# Patient Record
Sex: MALE | Race: WHITE | NOT HISPANIC OR LATINO | Employment: OTHER | URBAN - METROPOLITAN AREA
[De-identification: names, ages, dates, MRNs, and addresses within clinical notes are randomized per-mention and may not be internally consistent; named-entity substitution may affect disease eponyms.]

---

## 2017-03-06 PROBLEM — I25.10 CORONARY ARTERY DISEASE INVOLVING NATIVE CORONARY ARTERY OF NATIVE HEART WITHOUT ANGINA PECTORIS: Status: ACTIVE | Noted: 2017-03-06

## 2019-01-02 ENCOUNTER — HOSPITAL ENCOUNTER (OUTPATIENT)
Dept: RADIOLOGY | Facility: MEDICAL CENTER | Age: 79
End: 2019-01-02

## 2019-02-11 ENCOUNTER — HOSPITAL ENCOUNTER (OUTPATIENT)
Dept: LAB | Facility: MEDICAL CENTER | Age: 79
End: 2019-02-11
Attending: NEUROLOGICAL SURGERY
Payer: MEDICARE

## 2019-02-11 LAB
CFT BLD TEG: 7.8 MIN (ref 5–10)
CLOT ANGLE BLD TEG: 59.5 DEGREES (ref 53–72)
CLOT LYSIS 30M P MA LENFR BLD TEG: 3.3 % (ref 0–8)
CT.EXTRINSIC BLD ROTEM: 2.2 MIN (ref 1–3)
MCF BLD TEG: 62.6 MM (ref 50–70)
MEDICATIONS NOTED 1688: NORMAL
PA AA BLD-ACNC: 12.5 %
PA ADP BLD-ACNC: 1.2 %
PLT FUNCTION COL/EPI  1661: 100 SEC (ref 83–170)
TEG ALGORITHM TGALG: NORMAL

## 2019-02-11 PROCEDURE — 36415 COLL VENOUS BLD VENIPUNCTURE: CPT

## 2019-02-11 PROCEDURE — 85576 BLOOD PLATELET AGGREGATION: CPT | Mod: 91

## 2019-02-11 PROCEDURE — 85347 COAGULATION TIME ACTIVATED: CPT

## 2019-02-11 PROCEDURE — 85384 FIBRINOGEN ACTIVITY: CPT

## 2019-02-11 PROCEDURE — 85576 BLOOD PLATELET AGGREGATION: CPT

## 2022-09-07 ENCOUNTER — OFFICE VISIT (OUTPATIENT)
Dept: CARDIOTHORACIC SURGERY | Facility: MEDICAL CENTER | Age: 82
End: 2022-09-07
Payer: MEDICARE

## 2022-09-07 VITALS
OXYGEN SATURATION: 97 % | TEMPERATURE: 97.5 F | BODY MASS INDEX: 30.51 KG/M2 | SYSTOLIC BLOOD PRESSURE: 132 MMHG | HEIGHT: 63 IN | HEART RATE: 77 BPM | WEIGHT: 172.2 LBS | DIASTOLIC BLOOD PRESSURE: 74 MMHG

## 2022-09-07 DIAGNOSIS — I35.0 SEVERE AORTIC STENOSIS: ICD-10-CM

## 2022-09-07 PROCEDURE — 99205 OFFICE O/P NEW HI 60 MIN: CPT | Performed by: THORACIC SURGERY (CARDIOTHORACIC VASCULAR SURGERY)

## 2022-09-07 ASSESSMENT — ENCOUNTER SYMPTOMS
WHEEZING: 0
DIAPHORESIS: 0
LOSS OF CONSCIOUSNESS: 0
PSYCHIATRIC NEGATIVE: 1
CLAUDICATION: 1
SHORTNESS OF BREATH: 1
DIZZINESS: 1
HEARTBURN: 1
ABDOMINAL PAIN: 0
COUGH: 0
EYES NEGATIVE: 1
WEAKNESS: 0
NECK PAIN: 0
FEVER: 0

## 2022-09-07 ASSESSMENT — FIBROSIS 4 INDEX: FIB4 SCORE: 1.77

## 2022-09-07 NOTE — H&P
REFERRING PHYSICIAN: Dr. Chang MD.     CONSULTING PHYSICIAN: Elaina Abbasi MD     CHIEF COMPLAINT: dyspnea on exertion, chest pressure    HISTORY OF PRESENT ILLNESS: The patient is a 81 y.o. gentleman with known conditions of CAD, HTN, HLD, hypothyroid, DAVID, GERD and previous history of PCI reportedly with 7 stents placed.  At baseline he is very active and independent; he enjoys golf.  He comes in with complaints of dyspnea on exertion, chest pressure, bilateral shoulder and arm discomfort with exertion, some lightheadedness episodes without LOC. He denies chest pain, lower extremity edema, syncope, or PND.      PAST MEDICAL HISTORY:   Active Ambulatory Problems     Diagnosis Date Noted    Reflux esophagitis 06/20/2013    Hypothyroidism 10/06/1997    Essential hypertension, benign 06/20/2013    Hyperlipidemia 01/16/1998    Cardiovascular disease 06/20/2013    Renal insufficiency 02/25/2015    Hyperglycemia 02/25/2015    ASCVD (arteriosclerotic cardiovascular disease) 02/25/2015    Elevated PSA 02/25/2015    Gout 06/10/2015    Tear of medial cartilage or meniscus of knee, current 06/15/2015    Coronary artery disease involving native coronary artery of native heart without angina pectoris 03/06/2017    Acquired hallux rigidus 11/16/2011    GERD (gastroesophageal reflux disease) 05/15/2007    Hx of percutaneous transluminal coronary angioplasty 10/10/2003    Obstructive sleep apnea 10/26/2012    Presence of coronary angioplasty implant and graft 12/13/2012    Swelling of first metatarsophalangeal (MTP) joint 11/16/2011    HTN (hypertension) 05/15/2007    Atherosclerotic heart disease of native coronary artery without angina pectoris 05/30/2012     Resolved Ambulatory Problems     Diagnosis Date Noted    No Resolved Ambulatory Problems     Past Medical History:   Diagnosis Date    Anticoagulant long-term use     BPH (benign prostatic hyperplasia)     BPH (benign prostatic hyperplasia)     Esophageal reflux      Gout flare     Hypertension     Sleep apnea     Thyroid disease        PAST SURGICAL HISTORY:   Past Surgical History:   Procedure Laterality Date    KNEE ARTHROSCOPY  6/15/2015    Procedure: KNEE ARTHROSCOPIC Meniscal Excision, Abrasion Chondroplasty, Synovectomy;  Surgeon: Yasmani Silverio III, M.D.;  Location: SURGERY [facility];  Service:     APPENDECTOMY  2006    ruptured while in [place]    ARTHROPLASTY Right     BUNIONECTOMY Bilateral     FUSION, SPINE, LUMBAR, PLIF      INGUINAL HERNIA REPAIR Left     KNEE ARTHROTOMY Right     36 yrs ago    [facility] CARDIAC CATH  2003 to 2013    x7 with placement 7 stents  (3 were angioplasty)        ALLERGIES: No Known Allergies     CURRENT MEDICATIONS:   Current Outpatient Medications:     atorvastatin (LIPITOR) 80 MG tablet, Take 1 Tablet by mouth every day., Disp: 90 Tablet, Rfl: 1    levothyroxine (SYNTHROID) 100 MCG Tab, Take 1 Tablet by mouth every day., Disp: 90 Tablet, Rfl: 2    amlodipine-benazepril (LOTREL) 5-20 MG per capsule, Take 1 Capsule by mouth every day., Disp: 90 Capsule, Rfl: 3    omeprazole (PRILOSEC) 40 MG delayed-release capsule, Take 1 Capsule by mouth every day., Disp: 90 Capsule, Rfl: 2    Omega-3 Fatty Acids (FISH OIL) 1000 MG Cap capsule, Take 1,000 mg by mouth 3 times a day, with meals., Disp: , Rfl:     multivitamin (THERAGRAN) Tab, Take 1 Tab by mouth every day., Disp: , Rfl:     clopidogrel (PLAVIX) 75 MG Tab, Take 1 Tab by mouth every day., Disp: 90 Tab, Rfl: 3    aspirin (ASA) 325 MG Tab, Take 0.5 Tablets by mouth. 81 mg, Disp: , Rfl:     nitroGLYCERIN (NITROSTAT) 0.3 MG SL tablet, Place 1 Tab under tongue One-time as needed for Chest Pain for up to 1 dose., Disp: 10 Tab, Rfl: 0    atenolol (TENORMIN) 50 MG Tab, Take 0.5 Tablets by mouth 2 times a day., Disp: 180 Tablet, Rfl: 1    FAMILY HISTORY: Negative for early CAD    SOCIAL HISTORY:   Social History     Socioeconomic History    Marital status:      Spouse name: Christine    Number of  children: 5    Years of education: Not on file    Highest education level: Not on file   Occupational History    Not on file   Tobacco Use    Smoking status: Former     Packs/day: 0.50     Years: 50.00     Pack years: 25.00     Types: Cigarettes     Quit date: 2003     Years since quittin.0    Smokeless tobacco: Never   Vaping Use    Vaping Use: Never used   Substance and Sexual Activity    Alcohol use: Not Currently     Alcohol/week: 0.0 oz    Drug use: No    Sexual activity: Not on file   Other Topics Concern    Not on file   Social History Narrative    Not on file     Social Determinants of Health     Financial Resource Strain: Not on file   Food Insecurity: Not on file   Transportation Needs: Not on file   Physical Activity: Inactive    Days of Exercise per Week: 0 days    Minutes of Exercise per Session: 0 min   Stress: Not on file   Social Connections: Not on file   Intimate Partner Violence: Not on file   Housing Stability: Not on file       REVIEW OF SYSTEMS:  Review of Systems   Constitutional:  Negative for diaphoresis, fever and malaise/fatigue.   HENT: Negative.     Eyes: Negative.    Respiratory:  Positive for shortness of breath. Negative for cough and wheezing.    Cardiovascular:  Positive for claudication. Negative for chest pain and leg swelling.        Chest pressure without pain on exertion     Gastrointestinal:  Positive for heartburn. Negative for abdominal pain.   Genitourinary: Negative.    Musculoskeletal:  Positive for joint pain. Negative for neck pain.   Skin: Negative.    Neurological:  Positive for dizziness. Negative for loss of consciousness and weakness.   Endo/Heme/Allergies: Negative.    Psychiatric/Behavioral: Negative.         PHYSICAL EXAMINATION:    Physical Exam  Vitals reviewed.   Constitutional:       General: He is not in acute distress.     Appearance: Normal appearance.   HENT:      Head: Normocephalic and atraumatic.      Right Ear: External ear normal.       "Left Ear: External ear normal.      Nose: Nose normal. No congestion.   Eyes:      General: No scleral icterus.     Extraocular Movements: Extraocular movements intact.      Conjunctiva/sclera: Conjunctivae normal.   Cardiovascular:      Rate and Rhythm: Normal rate and regular rhythm.   Pulmonary:      Effort: Pulmonary effort is normal. No respiratory distress.   Abdominal:      General: There is no distension.      Palpations: Abdomen is soft.   Musculoskeletal:         General: Normal range of motion.      Cervical back: Normal range of motion.   Skin:     General: Skin is warm and dry.      Coloration: Skin is not jaundiced.      Findings: No rash.   Neurological:      Mental Status: He is alert and oriented to person, place, and time.      Cranial Nerves: No cranial nerve deficit.   Psychiatric:         Mood and Affect: Mood normal.         Behavior: Behavior normal.     /74 (BP Location: Left arm, Patient Position: Sitting, BP Cuff Size: Adult)   Pulse 77   Temp 36.4 °C (97.5 °F) (Temporal)   Ht 1.6 m (5' 3\")   Wt 78.1 kg (172 lb 3.2 oz)   SpO2 97%   BMI 30.50 kg/m²      LABS REVIEWED:  Lab Results   Component Value Date/Time    SODIUM 140 08/22/2022 08:31 AM    SODIUM 140 10/19/2021 07:35 AM    POTASSIUM 4.8 08/22/2022 08:31 AM    POTASSIUM 4.4 10/19/2021 07:35 AM    CHLORIDE 108 08/22/2022 08:31 AM    CHLORIDE 107 10/19/2021 07:35 AM    CO2 25 08/22/2022 08:31 AM    CO2 23 10/19/2021 07:35 AM    GLUCOSE 106 (H) 08/22/2022 08:31 AM    GLUCOSE 131 (H) 10/19/2021 07:35 AM    BUN 20 08/22/2022 08:31 AM    BUN 23 (H) 10/19/2021 07:35 AM    CREATININE 1.16 08/22/2022 08:31 AM    CREATININE 1.3 10/19/2021 07:35 AM    GLOMRATE 60 (L) 08/22/2022 08:31 AM      Lab Results   Component Value Date/Time    PROTHROMBTM 9.6 01/27/2016 02:45 PM    INR 0.93 01/27/2016 02:45 PM      Lab Results   Component Value Date/Time    WBC 7.7 10/19/2021 07:35 AM    RBC 5.25 10/19/2021 07:35 AM    HEMOGLOBIN 15.3 10/19/2021 " 07:35 AM    HEMATOCRIT 46.1 10/19/2021 07:35 AM    MCV 87.8 10/19/2021 07:35 AM    MCH 29.1 10/19/2021 07:35 AM    MCHC 33.2 10/19/2021 07:35 AM    MPV 12.0 (H) 10/19/2021 07:35 AM        IMAGING NOT AVAILABLE REPORTS and DOCUMENTATION FROM REFERRING DRS REVIEWED AND INTERPRETED:    ECHOCARDIOGRAM: The patient had   occasional PACs during the exam. The patient had frequent PVCs during the exam.   Left Ventricle:   There is mild concentric left ventricular hypertrophy. The left ventricle is   normal in size. The Ejection Fraction estimate is 50-55%. There is basal inferior hypokinesis. A   variety of Doppler measurements indicate grade II diastolic dysfunction associated with increased   left atrial pressures. The E/E' ratio between the mitral E wave and the mitral annulus E' wave is   19.9.   Right Ventricle:   The right ventricle is grossly normal size. The right ventricular systolic   function is normal.   Atria:   The left atrium is mildly dilated. The left atrial volume index is estimated to be 34-41   ml/m2. The right atrium is normal in size. There is no Doppler evidence for an interatrial shunt.   Mitral Valve:   There is mild mitral annular calcification. The Mitral valve area is calculated at   2.2 cm^2 by the VTI. The mean pressure gradient is 1.5 mmHg. There is mild mitral regurgitation.   Aortic Valve:   The aortic valve is calcified. There is moderate to severe aortic stenosis. The   aortic valve area is calculated at 0.76 cm^2 by the continuity equation. The peak pressure gradient   is 56.8 mmHg. The mean pressure gradient is 33.5 mmHg. There is trace aortic regurgitation.   Tricuspid Valve:   The tricuspid is normal in structure and function. There is trace tricuspid   regurgitation. Right ventricular systolic pressure is elevated at 25-30 mmHg.   Pulmonic Valve:   The pulmonic valve is not well seen, but is grossly normal. There is no pulmonic   valvular regurgitation.   Great Vessels:   The aortic  root is normal size. The ascending aorta measures 36 mm in size. The   abdominal aorta measures 22 mm in size. The IVC was normal in size at < 23 mm and collapsed >50%   with respiration (RAP 3 mmHg).   Pericardium/Pleural:   There is no pericardial effusion.     ANGIOGRAM:     s/p [facility] coronary artery angiography via right radial arterial approach.   EF ~65% NWMA.  Normal LVEDP.     Moderate aortic stenosis.  Mean AV gradient 27.6mmHg.   Right dominant coronary anatomy.   Severe ISR of the mid RCA stents.  s/p laser atherectomy of the RCA.  s/p   JINA PCI of the mid RCA.   PTCA of the PLB for ISR without complications.   Mild proximal circumflex disease.  Moderate to severe obtuse marginal   disease.   Patent proximal and mid LAD stents with moderate LAD ISR.   Occlusion of first diagonal stent.       IMPRESSION:  82 yo gentleman with known medical conditions of CAD, HTN, HLD, peripheral arterial disease, hypothyroid, DAVID, GERD and previous history of PCI and 25 pack year smoking history now in remission now with severe symptomatic aortic stenosis.      PLAN:  I recommend the patient is a reasonable candidate for TAVR given age and preference. Agree with repeating TTE. CT scan scheduled soon to evaluate for access given known PAD.      The STS mortality risk score is 2.5% and the morbidity and mortality risk score is 12%. The scores were discussed with patient.     Thank you for this very challenging consultation and participation in the patient’s care.  I will keep you apprised of all future developments.            Sincerely,     Elaina Abbasi MD

## 2022-09-09 PROBLEM — I35.0 NONRHEUMATIC AORTIC VALVE STENOSIS: Status: ACTIVE | Noted: 2022-02-23

## 2022-09-09 PROBLEM — E78.5 DYSLIPIDEMIA: Status: ACTIVE | Noted: 2022-02-23

## 2022-09-09 PROBLEM — I34.0 NONRHEUMATIC MITRAL VALVE REGURGITATION: Status: ACTIVE | Noted: 2022-02-23

## 2023-09-22 PROBLEM — N18.31 STAGE 3A CHRONIC KIDNEY DISEASE (CKD): Status: ACTIVE | Noted: 2023-08-09

## 2023-09-22 PROBLEM — N40.0 BENIGN PROSTATIC HYPERPLASIA: Status: ACTIVE | Noted: 2023-09-22

## 2024-09-09 PROBLEM — M47.817 SPONDYLOSIS OF LUMBOSACRAL SPINE WITHOUT MYELOPATHY: Status: ACTIVE | Noted: 2024-09-09

## 2024-09-09 PROBLEM — I70.212 ATHEROSCLEROSIS OF NATIVE ARTERY OF LEFT LOWER EXTREMITY WITH INTERMITTENT CLAUDICATION (HCC): Status: ACTIVE | Noted: 2024-08-06

## 2025-02-27 ENCOUNTER — APPOINTMENT (OUTPATIENT)
Dept: RADIOLOGY | Facility: MEDICAL CENTER | Age: 85
End: 2025-02-27
Payer: COMMERCIAL

## 2025-02-27 ENCOUNTER — HOSPITAL ENCOUNTER (INPATIENT)
Facility: MEDICAL CENTER | Age: 85
End: 2025-02-27
Attending: INTERNAL MEDICINE | Admitting: INTERNAL MEDICINE
Payer: COMMERCIAL

## 2025-02-27 DIAGNOSIS — Z91.89 AT RISK FOR MALNUTRITION: ICD-10-CM

## 2025-02-27 DIAGNOSIS — K81.9 CHOLECYSTITIS: ICD-10-CM

## 2025-02-27 DIAGNOSIS — K85.10 GALLSTONE PANCREATITIS: ICD-10-CM

## 2025-02-27 DIAGNOSIS — I25.10 CARDIOVASCULAR DISEASE: ICD-10-CM

## 2025-02-27 DIAGNOSIS — E03.9 HYPOTHYROIDISM, UNSPECIFIED TYPE: ICD-10-CM

## 2025-02-27 DIAGNOSIS — Z91.89 AT RISK FOR CONSTIPATION: ICD-10-CM

## 2025-02-27 PROBLEM — I48.0 PAROXYSMAL ATRIAL FIBRILLATION (HCC): Status: ACTIVE | Noted: 2025-02-27

## 2025-02-27 LAB
ALBUMIN SERPL BCP-MCNC: 4.1 G/DL (ref 3.2–4.9)
ALBUMIN/GLOB SERPL: 1.4 G/DL
ALP SERPL-CCNC: 115 U/L (ref 30–99)
ALT SERPL-CCNC: 242 U/L (ref 2–50)
ANION GAP SERPL CALC-SCNC: 13 MMOL/L (ref 7–16)
AST SERPL-CCNC: 384 U/L (ref 12–45)
BILIRUB SERPL-MCNC: 2.3 MG/DL (ref 0.1–1.5)
BUN SERPL-MCNC: 19 MG/DL (ref 8–22)
CALCIUM ALBUM COR SERPL-MCNC: 9.1 MG/DL (ref 8.5–10.5)
CALCIUM SERPL-MCNC: 9.2 MG/DL (ref 8.5–10.5)
CHLORIDE SERPL-SCNC: 104 MMOL/L (ref 96–112)
CHOLEST SERPL-MCNC: 258 MG/DL (ref 100–199)
CO2 SERPL-SCNC: 21 MMOL/L (ref 20–33)
CREAT SERPL-MCNC: 1.35 MG/DL (ref 0.5–1.4)
ERYTHROCYTE [DISTWIDTH] IN BLOOD BY AUTOMATED COUNT: 42.6 FL (ref 35.9–50)
GFR SERPLBLD CREATININE-BSD FMLA CKD-EPI: 51 ML/MIN/1.73 M 2
GLOBULIN SER CALC-MCNC: 3 G/DL (ref 1.9–3.5)
GLUCOSE SERPL-MCNC: 139 MG/DL (ref 65–99)
HAV IGM SERPL QL IA: NORMAL
HBV CORE IGM SER QL: NORMAL
HBV SURFACE AG SER QL: NORMAL
HCT VFR BLD AUTO: 43 % (ref 42–52)
HCV AB SER QL: NORMAL
HDLC SERPL-MCNC: 57 MG/DL
HGB BLD-MCNC: 14.3 G/DL (ref 14–18)
HOLDING TUBE BB 8507: NORMAL
INR PPP: 1.06 (ref 0.87–1.13)
LACTATE SERPL-SCNC: 2.4 MMOL/L (ref 0.5–2)
LDLC SERPL CALC-MCNC: 187 MG/DL
LIPASE SERPL-CCNC: >3000 U/L (ref 11–82)
MAGNESIUM SERPL-MCNC: 2.3 MG/DL (ref 1.5–2.5)
MCH RBC QN AUTO: 28.8 PG (ref 27–33)
MCHC RBC AUTO-ENTMCNC: 33.3 G/DL (ref 32.3–36.5)
MCV RBC AUTO: 86.7 FL (ref 81.4–97.8)
NT-PROBNP SERPL IA-MCNC: 1308 PG/ML (ref 0–125)
PHOSPHATE SERPL-MCNC: 3.9 MG/DL (ref 2.5–4.5)
PLATELET # BLD AUTO: 251 K/UL (ref 164–446)
PMV BLD AUTO: 11.2 FL (ref 9–12.9)
POTASSIUM SERPL-SCNC: 4.6 MMOL/L (ref 3.6–5.5)
PROT SERPL-MCNC: 7.1 G/DL (ref 6–8.2)
PROTHROMBIN TIME: 13.8 SEC (ref 12–14.6)
RBC # BLD AUTO: 4.96 M/UL (ref 4.7–6.1)
SODIUM SERPL-SCNC: 138 MMOL/L (ref 135–145)
TRIGL SERPL-MCNC: 70 MG/DL (ref 0–149)
TROPONIN T SERPL-MCNC: 24 NG/L (ref 6–19)
TROPONIN T SERPL-MCNC: 26 NG/L (ref 6–19)
WBC # BLD AUTO: 20.1 K/UL (ref 4.8–10.8)

## 2025-02-27 PROCEDURE — 36415 COLL VENOUS BLD VENIPUNCTURE: CPT

## 2025-02-27 PROCEDURE — 83690 ASSAY OF LIPASE: CPT

## 2025-02-27 PROCEDURE — 80061 LIPID PANEL: CPT

## 2025-02-27 PROCEDURE — 84100 ASSAY OF PHOSPHORUS: CPT

## 2025-02-27 PROCEDURE — 83735 ASSAY OF MAGNESIUM: CPT

## 2025-02-27 PROCEDURE — 700111 HCHG RX REV CODE 636 W/ 250 OVERRIDE (IP): Performed by: STUDENT IN AN ORGANIZED HEALTH CARE EDUCATION/TRAINING PROGRAM

## 2025-02-27 PROCEDURE — 87522 HEPATITIS C REVRS TRNSCRPJ: CPT

## 2025-02-27 PROCEDURE — 85610 PROTHROMBIN TIME: CPT

## 2025-02-27 PROCEDURE — 85027 COMPLETE CBC AUTOMATED: CPT

## 2025-02-27 PROCEDURE — 84484 ASSAY OF TROPONIN QUANT: CPT

## 2025-02-27 PROCEDURE — 83605 ASSAY OF LACTIC ACID: CPT

## 2025-02-27 PROCEDURE — 99223 1ST HOSP IP/OBS HIGH 75: CPT | Mod: AI | Performed by: STUDENT IN AN ORGANIZED HEALTH CARE EDUCATION/TRAINING PROGRAM

## 2025-02-27 PROCEDURE — A9270 NON-COVERED ITEM OR SERVICE: HCPCS | Performed by: STUDENT IN AN ORGANIZED HEALTH CARE EDUCATION/TRAINING PROGRAM

## 2025-02-27 PROCEDURE — 80053 COMPREHEN METABOLIC PANEL: CPT

## 2025-02-27 PROCEDURE — 700102 HCHG RX REV CODE 250 W/ 637 OVERRIDE(OP): Performed by: STUDENT IN AN ORGANIZED HEALTH CARE EDUCATION/TRAINING PROGRAM

## 2025-02-27 PROCEDURE — 770020 HCHG ROOM/CARE - TELE (206)

## 2025-02-27 PROCEDURE — 83880 ASSAY OF NATRIURETIC PEPTIDE: CPT

## 2025-02-27 PROCEDURE — 700105 HCHG RX REV CODE 258: Performed by: STUDENT IN AN ORGANIZED HEALTH CARE EDUCATION/TRAINING PROGRAM

## 2025-02-27 PROCEDURE — 80074 ACUTE HEPATITIS PANEL: CPT

## 2025-02-27 RX ORDER — HEPARIN SODIUM 5000 [USP'U]/ML
5000 INJECTION, SOLUTION INTRAVENOUS; SUBCUTANEOUS EVERY 8 HOURS
Status: DISCONTINUED | OUTPATIENT
Start: 2025-02-27 | End: 2025-03-03

## 2025-02-27 RX ORDER — OMEPRAZOLE 20 MG/1
40 CAPSULE, DELAYED RELEASE ORAL DAILY
Status: DISCONTINUED | OUTPATIENT
Start: 2025-02-28 | End: 2025-03-06 | Stop reason: HOSPADM

## 2025-02-27 RX ORDER — FEBUXOSTAT 40 MG/1
40 TABLET, FILM COATED ORAL DAILY
Status: DISCONTINUED | OUTPATIENT
Start: 2025-02-28 | End: 2025-02-28

## 2025-02-27 RX ORDER — ACETAMINOPHEN 500 MG
1000 TABLET ORAL EVERY 6 HOURS PRN
Status: DISCONTINUED | OUTPATIENT
Start: 2025-03-04 | End: 2025-03-06 | Stop reason: HOSPADM

## 2025-02-27 RX ORDER — TAMSULOSIN HYDROCHLORIDE 0.4 MG/1
0.4 CAPSULE ORAL DAILY
Status: DISCONTINUED | OUTPATIENT
Start: 2025-02-28 | End: 2025-03-03

## 2025-02-27 RX ORDER — ROSUVASTATIN CALCIUM 20 MG/1
40 TABLET, COATED ORAL DAILY
Status: DISCONTINUED | OUTPATIENT
Start: 2025-02-28 | End: 2025-03-06 | Stop reason: HOSPADM

## 2025-02-27 RX ORDER — SODIUM CHLORIDE, SODIUM LACTATE, POTASSIUM CHLORIDE, CALCIUM CHLORIDE 600; 310; 30; 20 MG/100ML; MG/100ML; MG/100ML; MG/100ML
INJECTION, SOLUTION INTRAVENOUS CONTINUOUS
Status: DISCONTINUED | OUTPATIENT
Start: 2025-02-27 | End: 2025-03-02

## 2025-02-27 RX ORDER — AMLODIPINE AND BENAZEPRIL HYDROCHLORIDE 5; 20 MG/1; MG/1
1 CAPSULE ORAL DAILY
Status: DISCONTINUED | OUTPATIENT
Start: 2025-02-28 | End: 2025-02-28

## 2025-02-27 RX ORDER — AMOXICILLIN 250 MG
2 CAPSULE ORAL EVERY EVENING
Status: DISCONTINUED | OUTPATIENT
Start: 2025-02-27 | End: 2025-03-06 | Stop reason: HOSPADM

## 2025-02-27 RX ORDER — LEVOTHYROXINE SODIUM 100 UG/1
100 TABLET ORAL DAILY
Status: DISCONTINUED | OUTPATIENT
Start: 2025-02-28 | End: 2025-03-06 | Stop reason: HOSPADM

## 2025-02-27 RX ORDER — ACETAMINOPHEN 500 MG
1000 TABLET ORAL EVERY 6 HOURS
Status: DISPENSED | OUTPATIENT
Start: 2025-02-27 | End: 2025-03-04

## 2025-02-27 RX ORDER — ONDANSETRON 4 MG/1
4 TABLET, ORALLY DISINTEGRATING ORAL EVERY 4 HOURS PRN
Status: DISCONTINUED | OUTPATIENT
Start: 2025-02-27 | End: 2025-03-06 | Stop reason: HOSPADM

## 2025-02-27 RX ORDER — FINASTERIDE 5 MG/1
5 TABLET, FILM COATED ORAL DAILY
Status: DISCONTINUED | OUTPATIENT
Start: 2025-02-28 | End: 2025-03-06 | Stop reason: HOSPADM

## 2025-02-27 RX ORDER — ONDANSETRON 2 MG/ML
4 INJECTION INTRAMUSCULAR; INTRAVENOUS EVERY 4 HOURS PRN
Status: DISCONTINUED | OUTPATIENT
Start: 2025-02-27 | End: 2025-03-06 | Stop reason: HOSPADM

## 2025-02-27 RX ORDER — NITROGLYCERIN 0.4 MG/1
0.4 TABLET SUBLINGUAL
Status: DISCONTINUED | OUTPATIENT
Start: 2025-02-27 | End: 2025-03-06 | Stop reason: HOSPADM

## 2025-02-27 RX ORDER — EZETIMIBE 10 MG/1
10 TABLET ORAL DAILY
Status: DISCONTINUED | OUTPATIENT
Start: 2025-02-28 | End: 2025-03-06 | Stop reason: HOSPADM

## 2025-02-27 RX ORDER — OXYCODONE HYDROCHLORIDE 5 MG/1
2.5 TABLET ORAL EVERY 4 HOURS PRN
Refills: 0 | Status: DISCONTINUED | OUTPATIENT
Start: 2025-02-27 | End: 2025-03-06 | Stop reason: HOSPADM

## 2025-02-27 RX ORDER — LABETALOL HYDROCHLORIDE 5 MG/ML
10 INJECTION, SOLUTION INTRAVENOUS EVERY 4 HOURS PRN
Status: DISCONTINUED | OUTPATIENT
Start: 2025-02-27 | End: 2025-03-06 | Stop reason: HOSPADM

## 2025-02-27 RX ORDER — HYDROMORPHONE HYDROCHLORIDE 1 MG/ML
0.25 INJECTION, SOLUTION INTRAMUSCULAR; INTRAVENOUS; SUBCUTANEOUS EVERY 4 HOURS PRN
Status: DISCONTINUED | OUTPATIENT
Start: 2025-02-27 | End: 2025-03-06 | Stop reason: HOSPADM

## 2025-02-27 RX ORDER — DIGOXIN 125 MCG
62.5 TABLET ORAL DAILY
Status: DISCONTINUED | OUTPATIENT
Start: 2025-02-28 | End: 2025-03-06 | Stop reason: HOSPADM

## 2025-02-27 RX ORDER — POLYETHYLENE GLYCOL 3350 17 G/17G
1 POWDER, FOR SOLUTION ORAL
Status: DISCONTINUED | OUTPATIENT
Start: 2025-02-27 | End: 2025-03-06 | Stop reason: HOSPADM

## 2025-02-27 RX ORDER — OXYCODONE HYDROCHLORIDE 5 MG/1
5 TABLET ORAL EVERY 4 HOURS PRN
Refills: 0 | Status: DISCONTINUED | OUTPATIENT
Start: 2025-02-27 | End: 2025-03-06 | Stop reason: HOSPADM

## 2025-02-27 RX ORDER — SODIUM CHLORIDE, SODIUM LACTATE, POTASSIUM CHLORIDE, AND CALCIUM CHLORIDE .6; .31; .03; .02 G/100ML; G/100ML; G/100ML; G/100ML
500 INJECTION, SOLUTION INTRAVENOUS ONCE
Status: COMPLETED | OUTPATIENT
Start: 2025-02-27 | End: 2025-02-27

## 2025-02-27 RX ADMIN — HEPARIN SODIUM 5000 UNITS: 5000 INJECTION, SOLUTION INTRAVENOUS; SUBCUTANEOUS at 23:05

## 2025-02-27 RX ADMIN — OXYCODONE HYDROCHLORIDE 5 MG: 5 TABLET ORAL at 20:48

## 2025-02-27 RX ADMIN — ACETAMINOPHEN 1000 MG: 500 TABLET ORAL at 23:05

## 2025-02-27 RX ADMIN — SODIUM CHLORIDE, POTASSIUM CHLORIDE, SODIUM LACTATE AND CALCIUM CHLORIDE 500 ML: 600; 310; 30; 20 INJECTION, SOLUTION INTRAVENOUS at 23:09

## 2025-02-27 RX ADMIN — ACETAMINOPHEN 1000 MG: 500 TABLET ORAL at 20:06

## 2025-02-27 RX ADMIN — SODIUM CHLORIDE, POTASSIUM CHLORIDE, SODIUM LACTATE AND CALCIUM CHLORIDE: 600; 310; 30; 20 INJECTION, SOLUTION INTRAVENOUS at 20:16

## 2025-02-27 RX ADMIN — SENNOSIDES AND DOCUSATE SODIUM 2 TABLET: 50; 8.6 TABLET ORAL at 20:06

## 2025-02-27 SDOH — ECONOMIC STABILITY: TRANSPORTATION INSECURITY
IN THE PAST 12 MONTHS, HAS THE LACK OF TRANSPORTATION KEPT YOU FROM MEDICAL APPOINTMENTS OR FROM GETTING MEDICATIONS?: NO

## 2025-02-27 SDOH — ECONOMIC STABILITY: TRANSPORTATION INSECURITY
IN THE PAST 12 MONTHS, HAS LACK OF RELIABLE TRANSPORTATION KEPT YOU FROM MEDICAL APPOINTMENTS, MEETINGS, WORK OR FROM GETTING THINGS NEEDED FOR DAILY LIVING?: NO

## 2025-02-27 ASSESSMENT — SOCIAL DETERMINANTS OF HEALTH (SDOH)
WITHIN THE LAST YEAR, HAVE YOU BEEN KICKED, HIT, SLAPPED, OR OTHERWISE PHYSICALLY HURT BY YOUR PARTNER OR EX-PARTNER?: NO
WITHIN THE LAST YEAR, HAVE YOU BEEN KICKED, HIT, SLAPPED, OR OTHERWISE PHYSICALLY HURT BY YOUR PARTNER OR EX-PARTNER?: NO
IN THE PAST 12 MONTHS, HAS THE ELECTRIC, GAS, OIL, OR WATER COMPANY THREATENED TO SHUT OFF SERVICE IN YOUR HOME?: NO
WITHIN THE LAST YEAR, HAVE YOU BEEN AFRAID OF YOUR PARTNER OR EX-PARTNER?: NO
WITHIN THE PAST 12 MONTHS, THE FOOD YOU BOUGHT JUST DIDN'T LAST AND YOU DIDN'T HAVE MONEY TO GET MORE: NEVER TRUE
WITHIN THE LAST YEAR, HAVE YOU BEEN AFRAID OF YOUR PARTNER OR EX-PARTNER?: NO
WITHIN THE LAST YEAR, HAVE YOU BEEN HUMILIATED OR EMOTIONALLY ABUSED IN OTHER WAYS BY YOUR PARTNER OR EX-PARTNER?: NO
WITHIN THE LAST YEAR, HAVE YOU BEEN HUMILIATED OR EMOTIONALLY ABUSED IN OTHER WAYS BY YOUR PARTNER OR EX-PARTNER?: NO
WITHIN THE LAST YEAR, HAVE TO BEEN RAPED OR FORCED TO HAVE ANY KIND OF SEXUAL ACTIVITY BY YOUR PARTNER OR EX-PARTNER?: NO
WITHIN THE PAST 12 MONTHS, YOU WORRIED THAT YOUR FOOD WOULD RUN OUT BEFORE YOU GOT THE MONEY TO BUY MORE: NEVER TRUE
WITHIN THE LAST YEAR, HAVE TO BEEN RAPED OR FORCED TO HAVE ANY KIND OF SEXUAL ACTIVITY BY YOUR PARTNER OR EX-PARTNER?: NO

## 2025-02-27 ASSESSMENT — LIFESTYLE VARIABLES
ON A TYPICAL DAY WHEN YOU DRINK ALCOHOL HOW MANY DRINKS DO YOU HAVE: 0
AVERAGE NUMBER OF DAYS PER WEEK YOU HAVE A DRINK CONTAINING ALCOHOL: 0
EVER FELT BAD OR GUILTY ABOUT YOUR DRINKING: NO
HAVE YOU EVER FELT YOU SHOULD CUT DOWN ON YOUR DRINKING: NO
HOW MANY TIMES IN THE PAST YEAR HAVE YOU HAD 5 OR MORE DRINKS IN A DAY: 0
ALCOHOL_USE: NO
HAVE PEOPLE ANNOYED YOU BY CRITICIZING YOUR DRINKING: NO
EVER HAD A DRINK FIRST THING IN THE MORNING TO STEADY YOUR NERVES TO GET RID OF A HANGOVER: NO
TOTAL SCORE: 0
CONSUMPTION TOTAL: NEGATIVE

## 2025-02-27 ASSESSMENT — COGNITIVE AND FUNCTIONAL STATUS - GENERAL
SUGGESTED CMS G CODE MODIFIER DAILY ACTIVITY: CH
DAILY ACTIVITIY SCORE: 24
CLIMB 3 TO 5 STEPS WITH RAILING: A LITTLE
MOBILITY SCORE: 23
SUGGESTED CMS G CODE MODIFIER MOBILITY: CI

## 2025-02-27 ASSESSMENT — ENCOUNTER SYMPTOMS
BACK PAIN: 0
DOUBLE VISION: 0
COUGH: 0
NAUSEA: 1
ABDOMINAL PAIN: 1
BLURRED VISION: 0
FEVER: 0
HEADACHES: 0
SHORTNESS OF BREATH: 0
PALPITATIONS: 0
EYE PAIN: 0
NECK PAIN: 0
DIZZINESS: 0
DIARRHEA: 0
BLOOD IN STOOL: 0
WHEEZING: 0
VOMITING: 1
CHILLS: 0
CONSTIPATION: 0

## 2025-02-27 ASSESSMENT — PAIN DESCRIPTION - PAIN TYPE: TYPE: ACUTE PAIN

## 2025-02-27 ASSESSMENT — PATIENT HEALTH QUESTIONNAIRE - PHQ9
SUM OF ALL RESPONSES TO PHQ9 QUESTIONS 1 AND 2: 0
2. FEELING DOWN, DEPRESSED, IRRITABLE, OR HOPELESS: NOT AT ALL
1. LITTLE INTEREST OR PLEASURE IN DOING THINGS: NOT AT ALL

## 2025-02-27 ASSESSMENT — FIBROSIS 4 INDEX
FIB4 SCORE: 4.81
FIB4 SCORE: 5.44

## 2025-02-28 ENCOUNTER — APPOINTMENT (OUTPATIENT)
Dept: RADIOLOGY | Facility: MEDICAL CENTER | Age: 85
DRG: 417 | End: 2025-02-28
Attending: STUDENT IN AN ORGANIZED HEALTH CARE EDUCATION/TRAINING PROGRAM
Payer: COMMERCIAL

## 2025-02-28 VITALS
TEMPERATURE: 97.9 F | HEART RATE: 74 BPM | WEIGHT: 172.84 LBS | DIASTOLIC BLOOD PRESSURE: 72 MMHG | SYSTOLIC BLOOD PRESSURE: 136 MMHG | RESPIRATION RATE: 18 BRPM | OXYGEN SATURATION: 89 % | BODY MASS INDEX: 30.62 KG/M2

## 2025-02-28 LAB
ALBUMIN SERPL BCP-MCNC: 3.6 G/DL (ref 3.2–4.9)
ALBUMIN/GLOB SERPL: 1.4 G/DL
ALP SERPL-CCNC: 95 U/L (ref 30–99)
ALT SERPL-CCNC: 216 U/L (ref 2–50)
ANION GAP SERPL CALC-SCNC: 9 MMOL/L (ref 7–16)
AST SERPL-CCNC: 257 U/L (ref 12–45)
BASOPHILS # BLD AUTO: 0.4 % (ref 0–1.8)
BASOPHILS # BLD: 0.06 K/UL (ref 0–0.12)
BILIRUB SERPL-MCNC: 1.8 MG/DL (ref 0.1–1.5)
BUN SERPL-MCNC: 18 MG/DL (ref 8–22)
CALCIUM ALBUM COR SERPL-MCNC: 8.9 MG/DL (ref 8.5–10.5)
CALCIUM SERPL-MCNC: 8.6 MG/DL (ref 8.5–10.5)
CHLORIDE SERPL-SCNC: 106 MMOL/L (ref 96–112)
CO2 SERPL-SCNC: 24 MMOL/L (ref 20–33)
CREAT SERPL-MCNC: 1.4 MG/DL (ref 0.5–1.4)
EOSINOPHIL # BLD AUTO: 0.04 K/UL (ref 0–0.51)
EOSINOPHIL NFR BLD: 0.3 % (ref 0–6.9)
ERYTHROCYTE [DISTWIDTH] IN BLOOD BY AUTOMATED COUNT: 43 FL (ref 35.9–50)
GFR SERPLBLD CREATININE-BSD FMLA CKD-EPI: 49 ML/MIN/1.73 M 2
GLOBULIN SER CALC-MCNC: 2.5 G/DL (ref 1.9–3.5)
GLUCOSE SERPL-MCNC: 126 MG/DL (ref 65–99)
HCT VFR BLD AUTO: 39.1 % (ref 42–52)
HGB BLD-MCNC: 12.7 G/DL (ref 14–18)
IMM GRANULOCYTES # BLD AUTO: 0.08 K/UL (ref 0–0.11)
IMM GRANULOCYTES NFR BLD AUTO: 0.6 % (ref 0–0.9)
LACTATE SERPL-SCNC: 1.3 MMOL/L (ref 0.5–2)
LYMPHOCYTES # BLD AUTO: 1.44 K/UL (ref 1–4.8)
LYMPHOCYTES NFR BLD: 10 % (ref 22–41)
MCH RBC QN AUTO: 28.5 PG (ref 27–33)
MCHC RBC AUTO-ENTMCNC: 32.5 G/DL (ref 32.3–36.5)
MCV RBC AUTO: 87.7 FL (ref 81.4–97.8)
MONOCYTES # BLD AUTO: 0.9 K/UL (ref 0–0.85)
MONOCYTES NFR BLD AUTO: 6.2 % (ref 0–13.4)
NEUTROPHILS # BLD AUTO: 11.95 K/UL (ref 1.82–7.42)
NEUTROPHILS NFR BLD: 82.5 % (ref 44–72)
NRBC # BLD AUTO: 0 K/UL
NRBC BLD-RTO: 0 /100 WBC (ref 0–0.2)
PLATELET # BLD AUTO: 229 K/UL (ref 164–446)
PMV BLD AUTO: 11.2 FL (ref 9–12.9)
POTASSIUM SERPL-SCNC: 5.4 MMOL/L (ref 3.6–5.5)
PROT SERPL-MCNC: 6.1 G/DL (ref 6–8.2)
RBC # BLD AUTO: 4.46 M/UL (ref 4.7–6.1)
SODIUM SERPL-SCNC: 139 MMOL/L (ref 135–145)
WBC # BLD AUTO: 14.5 K/UL (ref 4.8–10.8)

## 2025-02-28 PROCEDURE — 700111 HCHG RX REV CODE 636 W/ 250 OVERRIDE (IP): Performed by: STUDENT IN AN ORGANIZED HEALTH CARE EDUCATION/TRAINING PROGRAM

## 2025-02-28 PROCEDURE — 99232 SBSQ HOSP IP/OBS MODERATE 35: CPT | Mod: GC | Performed by: INTERNAL MEDICINE

## 2025-02-28 PROCEDURE — 700105 HCHG RX REV CODE 258: Performed by: STUDENT IN AN ORGANIZED HEALTH CARE EDUCATION/TRAINING PROGRAM

## 2025-02-28 PROCEDURE — 83605 ASSAY OF LACTIC ACID: CPT

## 2025-02-28 PROCEDURE — 99222 1ST HOSP IP/OBS MODERATE 55: CPT | Performed by: STUDENT IN AN ORGANIZED HEALTH CARE EDUCATION/TRAINING PROGRAM

## 2025-02-28 PROCEDURE — 85025 COMPLETE CBC W/AUTO DIFF WBC: CPT

## 2025-02-28 PROCEDURE — A9270 NON-COVERED ITEM OR SERVICE: HCPCS | Performed by: STUDENT IN AN ORGANIZED HEALTH CARE EDUCATION/TRAINING PROGRAM

## 2025-02-28 PROCEDURE — 36415 COLL VENOUS BLD VENIPUNCTURE: CPT

## 2025-02-28 PROCEDURE — 770020 HCHG ROOM/CARE - TELE (206)

## 2025-02-28 PROCEDURE — 700102 HCHG RX REV CODE 250 W/ 637 OVERRIDE(OP): Performed by: STUDENT IN AN ORGANIZED HEALTH CARE EDUCATION/TRAINING PROGRAM

## 2025-02-28 PROCEDURE — 80053 COMPREHEN METABOLIC PANEL: CPT

## 2025-02-28 RX ORDER — AMLODIPINE BESYLATE 5 MG/1
5 TABLET ORAL
Status: DISCONTINUED | OUTPATIENT
Start: 2025-02-28 | End: 2025-03-06 | Stop reason: HOSPADM

## 2025-02-28 RX ORDER — METOPROLOL SUCCINATE 50 MG/1
50 TABLET, EXTENDED RELEASE ORAL DAILY
COMMUNITY

## 2025-02-28 RX ORDER — MAGNESIUM OXIDE 400 MG/1
400 TABLET ORAL DAILY
COMMUNITY

## 2025-02-28 RX ORDER — ALLOPURINOL 300 MG/1
300 TABLET ORAL DAILY
COMMUNITY
End: 2025-03-17

## 2025-02-28 RX ORDER — BENAZEPRIL HYDROCHLORIDE 5 MG/1
20 TABLET ORAL
Status: DISCONTINUED | OUTPATIENT
Start: 2025-02-28 | End: 2025-03-06 | Stop reason: HOSPADM

## 2025-02-28 RX ADMIN — ACETAMINOPHEN 1000 MG: 500 TABLET ORAL at 11:41

## 2025-02-28 RX ADMIN — ACETAMINOPHEN 1000 MG: 500 TABLET ORAL at 05:33

## 2025-02-28 RX ADMIN — SODIUM CHLORIDE, POTASSIUM CHLORIDE, SODIUM LACTATE AND CALCIUM CHLORIDE: 600; 310; 30; 20 INJECTION, SOLUTION INTRAVENOUS at 08:29

## 2025-02-28 RX ADMIN — DIGOXIN 62.5 MCG: 0.12 TABLET ORAL at 05:33

## 2025-02-28 RX ADMIN — ACETAMINOPHEN 1000 MG: 500 TABLET ORAL at 23:33

## 2025-02-28 RX ADMIN — HYDROMORPHONE HYDROCHLORIDE 0.25 MG: 1 INJECTION, SOLUTION INTRAMUSCULAR; INTRAVENOUS; SUBCUTANEOUS at 11:40

## 2025-02-28 RX ADMIN — AMLODIPINE BESYLATE 5 MG: 10 TABLET ORAL at 05:34

## 2025-02-28 RX ADMIN — HEPARIN SODIUM 5000 UNITS: 5000 INJECTION, SOLUTION INTRAVENOUS; SUBCUTANEOUS at 22:04

## 2025-02-28 RX ADMIN — TAMSULOSIN HYDROCHLORIDE 0.4 MG: 0.4 CAPSULE ORAL at 05:33

## 2025-02-28 RX ADMIN — OMEPRAZOLE 40 MG: 20 CAPSULE, DELAYED RELEASE ORAL at 05:34

## 2025-02-28 RX ADMIN — SODIUM CHLORIDE, POTASSIUM CHLORIDE, SODIUM LACTATE AND CALCIUM CHLORIDE: 600; 310; 30; 20 INJECTION, SOLUTION INTRAVENOUS at 19:22

## 2025-02-28 RX ADMIN — HEPARIN SODIUM 5000 UNITS: 5000 INJECTION, SOLUTION INTRAVENOUS; SUBCUTANEOUS at 05:35

## 2025-02-28 RX ADMIN — OXYCODONE HYDROCHLORIDE 5 MG: 5 TABLET ORAL at 10:29

## 2025-02-28 RX ADMIN — EZETIMIBE 10 MG: 10 TABLET ORAL at 05:32

## 2025-02-28 RX ADMIN — BENAZEPRIL HYDROCHLORIDE 20 MG: 20 TABLET, FILM COATED ORAL at 05:32

## 2025-02-28 RX ADMIN — LEVOTHYROXINE SODIUM 100 MCG: 0.1 TABLET ORAL at 05:34

## 2025-02-28 RX ADMIN — ROSUVASTATIN CALCIUM 40 MG: 20 TABLET, FILM COATED ORAL at 05:36

## 2025-02-28 RX ADMIN — OXYCODONE HYDROCHLORIDE 5 MG: 5 TABLET ORAL at 17:18

## 2025-02-28 RX ADMIN — FINASTERIDE 5 MG: 5 TABLET, FILM COATED ORAL at 05:34

## 2025-02-28 RX ADMIN — SENNOSIDES AND DOCUSATE SODIUM 2 TABLET: 50; 8.6 TABLET ORAL at 17:08

## 2025-02-28 RX ADMIN — ACETAMINOPHEN 1000 MG: 500 TABLET ORAL at 19:57

## 2025-02-28 RX ADMIN — OXYCODONE HYDROCHLORIDE 5 MG: 5 TABLET ORAL at 05:41

## 2025-02-28 RX ADMIN — HEPARIN SODIUM 5000 UNITS: 5000 INJECTION, SOLUTION INTRAVENOUS; SUBCUTANEOUS at 14:04

## 2025-02-28 ASSESSMENT — ENCOUNTER SYMPTOMS
ABDOMINAL PAIN: 1
VOMITING: 0
NAUSEA: 0
MUSCULOSKELETAL NEGATIVE: 1
CONSTIPATION: 0
DIARRHEA: 0
RESPIRATORY NEGATIVE: 1
CARDIOVASCULAR NEGATIVE: 1
CONSTITUTIONAL NEGATIVE: 1
EYES NEGATIVE: 1
PSYCHIATRIC NEGATIVE: 1
NEUROLOGICAL NEGATIVE: 1

## 2025-02-28 ASSESSMENT — PAIN DESCRIPTION - PAIN TYPE
TYPE: ACUTE PAIN

## 2025-02-28 ASSESSMENT — PATIENT HEALTH QUESTIONNAIRE - PHQ9
2. FEELING DOWN, DEPRESSED, IRRITABLE, OR HOPELESS: NOT AT ALL
1. LITTLE INTEREST OR PLEASURE IN DOING THINGS: NOT AT ALL
SUM OF ALL RESPONSES TO PHQ9 QUESTIONS 1 AND 2: 0

## 2025-02-28 ASSESSMENT — FIBROSIS 4 INDEX: FIB4 SCORE: 6.57

## 2025-02-28 NOTE — ASSESSMENT & PLAN NOTE
1 day history of epigastric pain with nausea and vomiting.  Elevated lipase. CT abdomen pelvis with contrast showing mild intra and extrahepatic biliary dilatation with gallstone present with distention of the gallbladder and mild peripancreatic stranding suggesting pancreatitis.  WBC and LFTs decreased subsequently, indication that stone possible passed.  - MRCP tomorrow. GI consult depending on results  - General surgery consulted-- they requested echo for risk stratification, but at this time they do not recommend surgery due to his age and cardiac risk factors. However, if surgically indicated, we will consult cardiology for further preop evaluation.  - Clear liquid diet. NPO at midnight  -   - Alcohol cessation  - LR 125cc/hr  - Pain regimen  - Antiemetics PRN

## 2025-02-28 NOTE — H&P
Hospital Medicine History & Physical Note    Date of Service  2/27/2025    Primary Care Physician  Kwame Fagan M.D.    Consultants  none    Specialist Names: N/A    Code Status  Full Code    Chief Complaint  No chief complaint on file.      History of Presenting Illness  Patient is an 84-year-old male with a past medical history of hypothyroidism, HTN, HLD, CAD status post 16 stents, PSA, GERD, aortic stenosis status post TAVR, CKD 3A, BPH that presents with 1 day history of epigastric pain with nausea and vomiting.    Patient states that they woke up today with epigastric pain.  Thought it would go away so they went golfing.  Finished 16 holes before they decided to go home due to the pain.  This progressively worsened and they had 1 episode of vomiting when they arrived in the ED.  Denies any Kamran prandial symptoms, denies any history of similar pain.    WBC 15.6, hemoglobin 15.0, , sodium 141, potassium 3.6, bicarb 25, anion gap 15, BUN 21, creatinine 1.5, , ALT 69, alk phos 110, T. bili 1.8, magnesium 1.7, lipase greater than 250, troponin 67>> 48.  Received hydromorphone 1 mg IV x 1, magnesium 2 g IV x 1, nitroglycerin 0.4 mg SL x 1, NS 1 L bolus x 1, Zofran 4 mg IV x 1. CT abdomen pelvis with contrast showing mild intra and extrahepatic biliary dilatation with gallstone present with distention of the gallbladder and mild peripancreatic stranding suggesting pancreatitis. CT PE without PE, otherwise unremarkable.  Chest x-ray without acute abnormality.    I discussed the plan of care with patient.    Review of Systems  Review of Systems   Constitutional:  Negative for chills and fever.   HENT:  Negative for ear pain.    Eyes:  Negative for blurred vision, double vision and pain.   Respiratory:  Negative for cough, shortness of breath and wheezing.    Cardiovascular:  Negative for chest pain, palpitations and leg swelling.   Gastrointestinal:  Positive for abdominal pain, nausea and  vomiting. Negative for blood in stool, constipation, diarrhea and melena.   Genitourinary:  Negative for dysuria, frequency and hematuria.   Musculoskeletal:  Negative for back pain, joint pain and neck pain.   Neurological:  Negative for dizziness and headaches.       Past Medical History   has a past medical history of Anticoagulant long-term use, Aortic stenosis, severe, ASCVD (arteriosclerotic cardiovascular disease), BPH (benign prostatic hyperplasia), CAD (coronary artery disease), Elevated PSA, Esophageal reflux, Gout flare, History of transcatheter aortic valve replacement (TAVR), Hyperlipidemia, Hypertension, Hypothyroid, PVD (peripheral vascular disease) ([facility]), and Sleep apnea.    Surgical History   has a past surgical history that includes appendectomy (2006); zzz cardiac cath (2003 to 2013); knee arthrotomy (Right); bunionectomy (Bilateral); inguinal hernia repair (Left); knee arthroscopy (06/15/2015); fusion, spine, lumbar, plif; arthroplasty (Right); and aortic valve replacement (N/A).     Family History  family history is not on file.   Family history reviewed with patient. There is no family history that is pertinent to the chief complaint.     Social History   reports that he quit smoking about 21 years ago. His smoking use included cigarettes. He started smoking about 71 years ago. He has a 25 pack-year smoking history. He has never used smokeless tobacco. He reports that he does not currently use alcohol. He reports that he does not use drugs.    Allergies  No Known Allergies    Medications  Prior to Admission Medications   Prescriptions Last Dose Informant Patient Reported? Taking?   Omega-3 Fatty Acids (FISH OIL) 1000 MG Cap capsule   Yes No   Sig: Take 1,000 mg by mouth 3 times a day, with meals.   amlodipine-benazepril (LOTREL) 5-20 MG per capsule   No No   Sig: Take 1 Capsule by mouth every day.   clopidogrel (PLAVIX) 75 MG Tab   No No   Sig: Take 1 Tablet by mouth every day.   digoxin  (LANOXIN) 125 MCG Tab   No No   Sig: Take 0.5 Tablets by mouth every day.   ezetimibe (ZETIA) 10 MG Tab   Yes No   Sig: Take 10 mg by mouth every day.   febuxostat (ULORIC) 40 MG Tab   No No   Sig: Take 1 Tablet by mouth every day.   finasteride (PROSCAR) 5 MG Tab   Yes No   Sig: Take 5 mg by mouth every day.   furosemide (LASIX) 20 MG Tab   No No   Sig: Take 1 Tablet by mouth every day.   levothyroxine (SYNTHROID) 100 MCG Tab   No No   Sig: TAKE 1 TABLET DAILY   multivitamin (THERAGRAN) Tab   Yes No   Sig: Take 1 Tab by mouth every day.   nitroGLYCERIN (NITROSTAT) 0.3 MG SL tablet   No No   Sig: Place 1 Tab under tongue One-time as needed for Chest Pain for up to 1 dose.   omeprazole (PRILOSEC) 40 MG delayed-release capsule   No No   Sig: TAKE 1 CAPSULE DAILY   potassium chloride ER (KLOR-CON) 10 MEQ tablet   No No   Sig: Take 1 Tablet by mouth every day.   rivaroxaban (XARELTO) 20 MG Tab tablet   Yes No   Sig: Take 20 mg by mouth with dinner.   rosuvastatin (CRESTOR) 40 MG tablet   Yes No   Sig: Take 40 mg by mouth every day.   tamsulosin (FLOMAX) 0.4 MG capsule   Yes No   Sig: Take 0.4 mg by mouth every day.      Facility-Administered Medications: None       Physical Exam  Temp:  [36.3 °C (97.3 °F)-36.4 °C (97.5 °F)] 36.3 °C (97.3 °F)  Pulse:  [60-74] 74  Resp:  [14-24] 20  BP: (158-198)/(70-81) 174/75  SpO2:  [86 %-98 %] 95 %                          Physical Exam  Vitals reviewed.   Constitutional:       General: He is not in acute distress.     Appearance: Normal appearance. He is not ill-appearing, toxic-appearing or diaphoretic.   HENT:      Head: Normocephalic and atraumatic.      Mouth/Throat:      Mouth: Mucous membranes are moist.      Pharynx: No oropharyngeal exudate or posterior oropharyngeal erythema.   Eyes:      General: No scleral icterus.     Extraocular Movements: Extraocular movements intact.      Conjunctiva/sclera: Conjunctivae normal.   Cardiovascular:      Rate and Rhythm: Normal rate and  "regular rhythm.      Heart sounds: Normal heart sounds. No murmur heard.     No friction rub. No gallop.   Pulmonary:      Effort: Pulmonary effort is normal. No respiratory distress.      Breath sounds: Normal breath sounds. No stridor. No wheezing, rhonchi or rales.   Abdominal:      General: Abdomen is flat. There is no distension.      Palpations: Abdomen is soft. There is no mass.      Tenderness: There is abdominal tenderness. There is no guarding or rebound.      Hernia: No hernia is present.      Comments: Right upper quadrant tenderness to palpation, Rojo's negative   Musculoskeletal:         General: No swelling or tenderness. Normal range of motion.      Cervical back: Neck supple. No rigidity.      Right lower leg: No edema.      Left lower leg: No edema.   Lymphadenopathy:      Cervical: No cervical adenopathy.   Skin:     General: Skin is warm and dry.      Coloration: Skin is not jaundiced.   Neurological:      Mental Status: He is alert and oriented to person, place, and time. Mental status is at baseline.      Cranial Nerves: No cranial nerve deficit.         Laboratory:  Recent Labs     02/27/25  1425   WBC 15.6*   RBC 5.14   HEMOGLOBIN 15.0   HEMATOCRIT 43.9   MCV 85.4   MCH 29.2   MCHC 34.2   RDW 13.5   PLATELETCT 284   MPV 11.2*     Recent Labs     02/27/25  1425   SODIUM 141   POTASSIUM 3.6   CHLORIDE 105   CO2 25   GLUCOSE 113*   BUN 21*   CREATININE 1.5*   CALCIUM 9.3     Recent Labs     02/27/25  1425   ALTSGPT 69   ASTSGOT 132*   ALKPHOSPHAT 110   TBILIRUBIN 1.8*   LIPASE >250*   GLUCOSE 113*         No results for input(s): \"NTPROBNP\" in the last 72 hours.      No results for input(s): \"TROPONINT\" in the last 72 hours.    Imaging:  [id number]    (Results Pending)       X-Ray:  I have personally reviewed the images and compared with prior images.    Assessment/Plan:  Justification for Admission Status  I anticipate this patient will require at least two midnights for appropriate " medical management, necessitating inpatient admission because likely gallstone pancreatitis requiring MRCP and GI consultation.    Patient will need a Telemetry bed on MEDICAL service .  The need is secondary to likely gallstone pancreatitis requiring MRCP and GI consultation.    * Gallstone pancreatitis- (present on admission)  Assessment & Plan  1 day history of epigastric pain with nausea and vomiting.  Elevated lipase. CT abdomen pelvis with contrast showing mild intra and extrahepatic biliary dilatation with gallstone present with distention of the gallbladder and mild peripancreatic stranding suggesting pancreatitis.    -Day team to consult GI depending on MRCP  -Order MRCP  -Order lipid panel  -Alcohol cessation  -NPO with advancement to clear liquid diet within first 24 hours  -LR@125cc/hr  -Multimodal pain management  -PRN antiemetics    Paroxysmal atrial fibrillation (HCC)- (present on admission)  Assessment & Plan  Not in RVR.    -Continue home digoxin  -Hold home rivaroxaban    Benign prostatic hyperplasia- (present on admission)  Assessment & Plan  -Continue home tamsulosin    Stage 3a chronic kidney disease (CKD)- (present on admission)  Assessment & Plan  Creatinine 1.5.  Baseline 1.3.    -Avoid nephrotoxic medications    Coronary artery disease involving native coronary artery of native heart without angina pectoris- (present on admission)  Assessment & Plan  Status post 16 stents.    -Hold home Plavix and rivaroxaban pending likely procedure    Obstructive sleep apnea- (present on admission)  Assessment & Plan  -CPAP    History of transcatheter aortic valve replacement (TAVR)- (present on admission)  Assessment & Plan  Status post TAVR.    GERD (gastroesophageal reflux disease)- (present on admission)  Assessment & Plan  -Continue home omeprazole    Mixed hyperlipidemia- (present on admission)  Assessment & Plan  -Continue home rosuvastatin    Essential hypertension- (present on  admission)  Assessment & Plan  -Continue home amlodipine-benazepril  -As needed labetalol        VTE prophylaxis: SCDs/TEDs and heparin ppx

## 2025-02-28 NOTE — PROGRESS NOTES
[facility] DIRECT ADMISSION REPORT  Transferring facility:   Transferring physician: Soraida    Chief complaint: Chest pain, epigastric pain, nausea, vomiting  Pertinent history & patient course: 84-year-old male with history of hypertension, dyslipidemia, CAD, MI, multiple stents placement, BPH, peripheral vascular disease, hypothyroidism, obstructive sleep apnea, anticoagulation with Xarelto who presented with complaints of chest pain and epigastric pain, nausea, vomiting.  Troponin elevated at 67, total bilirubin 1.8, WBC 15.6, lipase 9000  CT of the abdomen and pelvis showed intra and extra hepatic biliary dilation and possible gallstone pancreatitis.  Requested transfer for GI consult.  EKG negative for STEMI.  Pertinent imaging & lab results:   Consultants called prior to transfer and pertinent input from consultants:   Code Status:  per transferring provider, I personally verified with the transferring provider patient's code status and the transferring provider has confirmed this with the patient.  Reason for Transfer: Gallstone pancreatitis for ERCP  Further work up or recommendations requested prior to transfer:     Patient accepted for transfer: Yes  Accepting [facility] Facility: [facility] - Nursing to notify the Triage Coordinator in the RTOC via Voalte or Phone ext. [phone] when patient arrives to the unit. The Triage Coordinator will assign the admitting provider.    Consultants to be called upon arrival: Gastroenterology  Admission status: Inpatient.   Floor requested: Telemetry  If ICU transfer, name of intensivist case discussed with and pertinent input from critical care: N/A    The admitting provider is the point of contact for questions or concerns regarding patient's care.

## 2025-02-28 NOTE — HOSPITAL COURSE
Patient is an 84-year-old male with a past medical history of hypothyroidism, HTN, HLD, CAD status post 16 stents, PSA, GERD, aortic stenosis status post TAVR, CKD 3A, BPH that presented with epigastric pain with nausea and vomiting.  Pt woke up and complained of epigastric pain. He went golfing during the day, then came home with worsening pain. On arrival to ED he had one episode of vomiting. No history of similar pain/symptoms.     WBC 15.6, hemoglobin 15.0, , sodium 141, potassium 3.6, bicarb 25, anion gap 15, BUN 21, creatinine 1.5, , ALT 69, alk phos 110, T. bili 1.8, magnesium 1.7, lipase greater than 250, troponin 67>> 48.  Received hydromorphone 1 mg IV x 1, magnesium 2 g IV x 1, nitroglycerin 0.4 mg SL x 1, NS 1 L bolus x 1, Zofran 4 mg IV x 1. CT abdomen pelvis with contrast showed mild intra and extrahepatic biliary dilatation with gallstone present with distention of the gallbladder and mild peripancreatic stranding suggesting pancreatitis. CT PE without PE, otherwise unremarkable.  Chest x-ray without acute abnormality.

## 2025-02-28 NOTE — PROGRESS NOTES
Assumed care of pt. Pt A&Ox4 no complaints of SOB, N/V. No complaints of numbness or tingling. Pt reports pain of 2/10. Vitals show : T 97.3, HR 74, RR 20, /77, O2 95% on 2L NC. Updated on plan of care. Bed locked in lowest position. NPO.

## 2025-02-28 NOTE — PROGRESS NOTES
[facility] Internal Medicine Daily Progress Note    Date of Service  2/28/2025    R Team: R KARLO Bolanos Team   Attending: Padmini Hinkle M.d.  Senior Resident: Dr. Otis Melendez  Intern:  Dr. Roge Sol  Contact Number: [phone]    Chief Complaint  Enrico Cohen is a 84 y.o. male admitted 2/27/2025 with gallstone pancreatitis.    Hospital Course  Patient is an 84-year-old male with a past medical history of hypothyroidism, HTN, HLD, CAD status post 16 stents, PSA, GERD, aortic stenosis status post TAVR, CKD 3A, BPH that presented with epigastric pain with nausea and vomiting.  Pt woke up and complained of epigastric pain. He went golfing during the day, then came home with worsening pain. On arrival to ED he had one episode of vomiting. No history of similar pain/symptoms.     WBC 15.6, hemoglobin 15.0, , sodium 141, potassium 3.6, bicarb 25, anion gap 15, BUN 21, creatinine 1.5, , ALT 69, alk phos 110, T. bili 1.8, magnesium 1.7, lipase greater than 250, troponin 67>> 48.  Received hydromorphone 1 mg IV x 1, magnesium 2 g IV x 1, nitroglycerin 0.4 mg SL x 1, NS 1 L bolus x 1, Zofran 4 mg IV x 1. CT abdomen pelvis with contrast showed mild intra and extrahepatic biliary dilatation with gallstone present with distention of the gallbladder and mild peripancreatic stranding suggesting pancreatitis. CT PE without PE, otherwise unremarkable.  Chest x-ray without acute abnormality.    Interval Problem Update  Today pt still complains of on and off epigastric pain, now on the left upper quadrant as well. He has not vomited since the episode in the ED. He denies nausea, diarrhea, constipation, fevers, chills.  - WBC and LFTs trending down, could have possibly passed a stone.  - Plan for MRCP tomorrow. Will consult GI if with abnormal results.  - General surgery consulted-- they requested echo for risk stratification, but at this time they do not recommend surgery due to patient's age and cardiac  risk factors. However, if surgically indicated, we will consult cardiology for further preop evaluation.  - Holding rivaroxaban for possible procedure. On IV fluids. Pain regimen PRN.    I have discussed this patient's plan of care and discharge plan at IDT rounds today with Case Management, Nursing, Nursing leadership, and other members of the IDT team.    Consultants/Specialty  general surgery and GI    Code Status  Full Code    Disposition  The patient is not medically cleared for discharge to home or a post-acute facility.      I have placed the appropriate orders for post-discharge needs.    Review of Systems  Review of Systems   Constitutional: Negative.    HENT: Negative.     Eyes: Negative.    Respiratory: Negative.     Cardiovascular: Negative.    Gastrointestinal:  Positive for abdominal pain. Negative for constipation, diarrhea, nausea and vomiting.   Genitourinary: Negative.    Musculoskeletal: Negative.    Skin: Negative.    Neurological: Negative.    Endo/Heme/Allergies: Negative.    Psychiatric/Behavioral: Negative.          Physical Exam  Temp:  [36.3 °C (97.3 °F)-36.7 °C (98.1 °F)] 36.7 °C (98.1 °F)  Pulse:  [53-74] 67  Resp:  [18-20] 18  BP: (134-183)/(60-77) 163/64  SpO2:  [91 %-95 %] 91 %    Physical Exam  Constitutional:       Appearance: He is obese.   HENT:      Head: Normocephalic.   Cardiovascular:      Rate and Rhythm: Normal rate and regular rhythm.   Pulmonary:      Effort: Pulmonary effort is normal.      Breath sounds: Normal breath sounds.   Abdominal:      Tenderness: There is abdominal tenderness. There is no guarding or rebound.      Comments: Hyperactive bowel sounds. Tenderness to epigastric area. Rojo's sign negative   Musculoskeletal:         General: Normal range of motion.      Cervical back: Normal range of motion.   Skin:     General: Skin is warm.   Neurological:      General: No focal deficit present.      Mental Status: He is alert.   Psychiatric:         Mood and  Affect: Mood normal.         Fluids    Intake/Output Summary (Last 24 hours) at 2/28/2025 1726  Last data filed at 2/28/2025 1600  Gross per 24 hour   Intake 0 ml   Output 850 ml   Net -850 ml       Laboratory  Recent Labs     02/27/25 1425 02/27/25 2025 02/28/25  0139   WBC 15.6* 20.1* 14.5*   RBC 5.14 4.96 4.46*   HEMOGLOBIN 15.0 14.3 12.7*   HEMATOCRIT 43.9 43.0 39.1*   MCV 85.4 86.7 87.7   MCH 29.2 28.8 28.5   MCHC 34.2 33.3 32.5   RDW 13.5 42.6 43.0   PLATELETCT 284 251 229   MPV 11.2* 11.2 11.2     Recent Labs     02/27/25 1425 02/27/25 2025 02/28/25  0139   SODIUM 141 138 139   POTASSIUM 3.6 4.6 5.4   CHLORIDE 105 104 106   CO2 25 21 24   GLUCOSE 113* 139* 126*   BUN 21* 19 18   CREATININE 1.5* 1.35 1.40   CALCIUM 9.3 9.2 8.6     Recent Labs     02/27/25 2025   INR 1.06         Recent Labs     02/27/25 2025   TRIGLYCERIDE 70   HDL 57   *       Imaging  [id number]-S/O    (Results Pending)   EC-ECHOCARDIOGRAM COMPLETE W/O CONT    (Results Pending)        Assessment/Plan  Problem Representation:    * Gallstone pancreatitis- (present on admission)  Assessment & Plan  1 day history of epigastric pain with nausea and vomiting.  Elevated lipase. CT abdomen pelvis with contrast showing mild intra and extrahepatic biliary dilatation with gallstone present with distention of the gallbladder and mild peripancreatic stranding suggesting pancreatitis.  WBC and LFTs decreased subsequently, indication that stone possible passed.  - MRCP tomorrow. GI consult depending on results  - General surgery consulted-- they requested echo for risk stratification, but at this time they do not recommend surgery due to his age and cardiac risk factors. However, if surgically indicated, we will consult cardiology for further preop evaluation.  - Clear liquid diet. NPO at midnight  -   - Alcohol cessation  - LR 125cc/hr  - Pain regimen  - Antiemetics PRN    Paroxysmal atrial fibrillation (HCC)- (present on  admission)  Assessment & Plan  Not in RVR  - Continue home digoxin  - Hold home rivaroxaban    Benign prostatic hyperplasia- (present on admission)  Assessment & Plan  - Continue home tamsulosin    Stage 3a chronic kidney disease (CKD)- (present on admission)  Assessment & Plan  Baseline 1.3.  - Avoid nephrotoxic medications  - Renally dose medications as appropriate    Coronary artery disease involving native coronary artery of native heart without angina pectoris- (present on admission)  Assessment & Plan  Status post 16 stents  - Hold home Plavix and rivaroxaban pending likely procedure    Obstructive sleep apnea- (present on admission)  Assessment & Plan  - CPAP    History of transcatheter aortic valve replacement (TAVR)- (present on admission)  Assessment & Plan  Status post TAVR.    GERD (gastroesophageal reflux disease)- (present on admission)  Assessment & Plan  -Continue home omeprazole    Mixed hyperlipidemia- (present on admission)  Assessment & Plan    - Continue home Rosuvastatin    Essential hypertension- (present on admission)  Assessment & Plan  - Continue home amlodipine-benazepril  - As needed labetalol         VTE prophylaxis: SCDs/TEDs    I have performed a physical exam and reviewed and updated ROS and Plan today (2/28/2025). In review of yesterday's note (2/27/2025), there are no changes except as documented above.

## 2025-02-28 NOTE — PROGRESS NOTES
4 Eyes Skin Assessment Completed by JOSEPH Avendaño and JOSEPH Encarnacion.    Head WDL  Ears WDL  Nose WDL  Mouth WDL  Neck WDL  Breast/Chest WDL  Shoulder Blades WDL  Spine WDL, Scattered moles  (R) Arm/Elbow/Hand WDL  (L) Arm/Elbow/Hand WDL  Abdomen WDL  Groin WDL  Scrotum/Coccyx/Buttocks Blanching  (R) Leg WDL  (L) Leg WDL  (R) Heel/Foot/Toe Blanching  (L) Heel/Foot/Toe Blanching          Devices In Places Tele Box, Blood Pressure Cuff, Pulse Ox, SCD's, and Nasal Cannula      Interventions In Place NC W/Ear Foams, Pillows, and Heels Loaded W/Pillows    Possible Skin Injury No    Pictures Uploaded Into Epic N/A  Wound Consult Placed N/A  RN Wound Prevention Protocol Ordered No

## 2025-02-28 NOTE — PROGRESS NOTES
*New Admit on telemetry 2036    SR/SB  HR Range: 51 - 59  BBB, PVC, PAC  7 bts Vtach    .18/.12/.38    *per monitor tech

## 2025-02-28 NOTE — PROGRESS NOTES
Med rec complete per pt and wife via phone  Permission given by pt to contact wife for a med list.     Pharmacist notified.    Pt last had Xarelto and Plavix yesterday morning

## 2025-03-01 ENCOUNTER — APPOINTMENT (OUTPATIENT)
Dept: RADIOLOGY | Facility: MEDICAL CENTER | Age: 85
DRG: 417 | End: 2025-03-01
Payer: COMMERCIAL

## 2025-03-01 ENCOUNTER — APPOINTMENT (OUTPATIENT)
Dept: RADIOLOGY | Facility: MEDICAL CENTER | Age: 85
DRG: 417 | End: 2025-03-01
Attending: INTERNAL MEDICINE
Payer: COMMERCIAL

## 2025-03-01 LAB
ALBUMIN SERPL BCP-MCNC: 3.3 G/DL (ref 3.2–4.9)
ALBUMIN SERPL BCP-MCNC: 3.8 G/DL (ref 3.2–4.9)
ALBUMIN/GLOB SERPL: 1 G/DL
ALBUMIN/GLOB SERPL: 1.2 G/DL
ALP SERPL-CCNC: 107 U/L (ref 30–99)
ALP SERPL-CCNC: 148 U/L (ref 30–99)
ALT SERPL-CCNC: 157 U/L (ref 2–50)
ALT SERPL-CCNC: 179 U/L (ref 2–50)
ANION GAP SERPL CALC-SCNC: 17 MMOL/L (ref 7–16)
ANION GAP SERPL CALC-SCNC: 8 MMOL/L (ref 7–16)
AST SERPL-CCNC: 114 U/L (ref 12–45)
AST SERPL-CCNC: 125 U/L (ref 12–45)
BASOPHILS # BLD AUTO: 0.5 % (ref 0–1.8)
BASOPHILS # BLD: 0.06 K/UL (ref 0–0.12)
BILIRUB SERPL-MCNC: 2 MG/DL (ref 0.1–1.5)
BILIRUB SERPL-MCNC: 2.4 MG/DL (ref 0.1–1.5)
BUN SERPL-MCNC: 15 MG/DL (ref 8–22)
BUN SERPL-MCNC: 16 MG/DL (ref 8–22)
CALCIUM ALBUM COR SERPL-MCNC: 9.1 MG/DL (ref 8.5–10.5)
CALCIUM ALBUM COR SERPL-MCNC: 9.2 MG/DL (ref 8.5–10.5)
CALCIUM SERPL-MCNC: 8.5 MG/DL (ref 8.5–10.5)
CALCIUM SERPL-MCNC: 9 MG/DL (ref 8.5–10.5)
CHLORIDE SERPL-SCNC: 102 MMOL/L (ref 96–112)
CHLORIDE SERPL-SCNC: 104 MMOL/L (ref 96–112)
CO2 SERPL-SCNC: 15 MMOL/L (ref 20–33)
CO2 SERPL-SCNC: 23 MMOL/L (ref 20–33)
CREAT SERPL-MCNC: 1.03 MG/DL (ref 0.5–1.4)
CREAT SERPL-MCNC: 1.12 MG/DL (ref 0.5–1.4)
EKG IMPRESSION: NORMAL
EOSINOPHIL # BLD AUTO: 0.03 K/UL (ref 0–0.51)
EOSINOPHIL NFR BLD: 0.2 % (ref 0–6.9)
ERYTHROCYTE [DISTWIDTH] IN BLOOD BY AUTOMATED COUNT: 47.9 FL (ref 35.9–50)
GFR SERPLBLD CREATININE-BSD FMLA CKD-EPI: 64 ML/MIN/1.73 M 2
GFR SERPLBLD CREATININE-BSD FMLA CKD-EPI: 71 ML/MIN/1.73 M 2
GLOBULIN SER CALC-MCNC: 2.7 G/DL (ref 1.9–3.5)
GLOBULIN SER CALC-MCNC: 3.8 G/DL (ref 1.9–3.5)
GLUCOSE BLD STRIP.AUTO-MCNC: 136 MG/DL (ref 65–99)
GLUCOSE SERPL-MCNC: 100 MG/DL (ref 65–99)
GLUCOSE SERPL-MCNC: 129 MG/DL (ref 65–99)
HCT VFR BLD AUTO: 38 % (ref 42–52)
HGB BLD-MCNC: 12.2 G/DL (ref 14–18)
IMM GRANULOCYTES # BLD AUTO: 0.06 K/UL (ref 0–0.11)
IMM GRANULOCYTES NFR BLD AUTO: 0.5 % (ref 0–0.9)
LACTATE SERPL-SCNC: 1.1 MMOL/L (ref 0.5–2)
LACTATE SERPL-SCNC: 4.3 MMOL/L (ref 0.5–2)
LIPASE SERPL-CCNC: 209 U/L (ref 11–82)
LYMPHOCYTES # BLD AUTO: 1.01 K/UL (ref 1–4.8)
LYMPHOCYTES NFR BLD: 7.8 % (ref 22–41)
MCH RBC QN AUTO: 29.7 PG (ref 27–33)
MCHC RBC AUTO-ENTMCNC: 32.1 G/DL (ref 32.3–36.5)
MCV RBC AUTO: 92.5 FL (ref 81.4–97.8)
MONOCYTES # BLD AUTO: 1.05 K/UL (ref 0–0.85)
MONOCYTES NFR BLD AUTO: 8.2 % (ref 0–13.4)
NEUTROPHILS # BLD AUTO: 10.66 K/UL (ref 1.82–7.42)
NEUTROPHILS NFR BLD: 82.8 % (ref 44–72)
NRBC # BLD AUTO: 0.02 K/UL
NRBC BLD-RTO: 0.2 /100 WBC (ref 0–0.2)
PLATELET # BLD AUTO: 203 K/UL (ref 164–446)
PMV BLD AUTO: 12.4 FL (ref 9–12.9)
POTASSIUM SERPL-SCNC: 4.1 MMOL/L (ref 3.6–5.5)
POTASSIUM SERPL-SCNC: 4.3 MMOL/L (ref 3.6–5.5)
PROT SERPL-MCNC: 6 G/DL (ref 6–8.2)
PROT SERPL-MCNC: 7.6 G/DL (ref 6–8.2)
RBC # BLD AUTO: 4.11 M/UL (ref 4.7–6.1)
SODIUM SERPL-SCNC: 134 MMOL/L (ref 135–145)
SODIUM SERPL-SCNC: 135 MMOL/L (ref 135–145)
TROPONIN T SERPL-MCNC: 28 NG/L (ref 6–19)
WBC # BLD AUTO: 12.9 K/UL (ref 4.8–10.8)

## 2025-03-01 PROCEDURE — 83690 ASSAY OF LIPASE: CPT

## 2025-03-01 PROCEDURE — 700101 HCHG RX REV CODE 250

## 2025-03-01 PROCEDURE — 94640 AIRWAY INHALATION TREATMENT: CPT

## 2025-03-01 PROCEDURE — 99291 CRITICAL CARE FIRST HOUR: CPT | Mod: 25,GC | Performed by: INTERNAL MEDICINE

## 2025-03-01 PROCEDURE — A9270 NON-COVERED ITEM OR SERVICE: HCPCS | Performed by: STUDENT IN AN ORGANIZED HEALTH CARE EDUCATION/TRAINING PROGRAM

## 2025-03-01 PROCEDURE — 71275 CT ANGIOGRAPHY CHEST: CPT

## 2025-03-01 PROCEDURE — 83605 ASSAY OF LACTIC ACID: CPT | Mod: 91

## 2025-03-01 PROCEDURE — 700111 HCHG RX REV CODE 636 W/ 250 OVERRIDE (IP): Mod: JZ | Performed by: INTERNAL MEDICINE

## 2025-03-01 PROCEDURE — 85025 COMPLETE CBC W/AUTO DIFF WBC: CPT

## 2025-03-01 PROCEDURE — 71260 CT THORAX DX C+: CPT

## 2025-03-01 PROCEDURE — 71045 X-RAY EXAM CHEST 1 VIEW: CPT

## 2025-03-01 PROCEDURE — 770000 HCHG ROOM/CARE - INTERMEDIATE ICU *

## 2025-03-01 PROCEDURE — 74181 MRI ABDOMEN W/O CONTRAST: CPT

## 2025-03-01 PROCEDURE — 99292 CRITICAL CARE ADDL 30 MIN: CPT | Performed by: INTERNAL MEDICINE

## 2025-03-01 PROCEDURE — 700102 HCHG RX REV CODE 250 W/ 637 OVERRIDE(OP): Performed by: INTERNAL MEDICINE

## 2025-03-01 PROCEDURE — 700117 HCHG RX CONTRAST REV CODE 255

## 2025-03-01 PROCEDURE — 84484 ASSAY OF TROPONIN QUANT: CPT

## 2025-03-01 PROCEDURE — 93010 ELECTROCARDIOGRAM REPORT: CPT | Performed by: INTERNAL MEDICINE

## 2025-03-01 PROCEDURE — 80053 COMPREHEN METABOLIC PANEL: CPT

## 2025-03-01 PROCEDURE — 93005 ELECTROCARDIOGRAM TRACING: CPT | Mod: TC

## 2025-03-01 PROCEDURE — 99232 SBSQ HOSP IP/OBS MODERATE 35: CPT | Performed by: STUDENT IN AN ORGANIZED HEALTH CARE EDUCATION/TRAINING PROGRAM

## 2025-03-01 PROCEDURE — 82962 GLUCOSE BLOOD TEST: CPT

## 2025-03-01 PROCEDURE — A9270 NON-COVERED ITEM OR SERVICE: HCPCS | Performed by: INTERNAL MEDICINE

## 2025-03-01 PROCEDURE — 700102 HCHG RX REV CODE 250 W/ 637 OVERRIDE(OP): Performed by: STUDENT IN AN ORGANIZED HEALTH CARE EDUCATION/TRAINING PROGRAM

## 2025-03-01 PROCEDURE — 700111 HCHG RX REV CODE 636 W/ 250 OVERRIDE (IP): Mod: TB | Performed by: STUDENT IN AN ORGANIZED HEALTH CARE EDUCATION/TRAINING PROGRAM

## 2025-03-01 PROCEDURE — 700105 HCHG RX REV CODE 258: Performed by: STUDENT IN AN ORGANIZED HEALTH CARE EDUCATION/TRAINING PROGRAM

## 2025-03-01 RX ORDER — METRONIDAZOLE 500 MG/100ML
500 INJECTION, SOLUTION INTRAVENOUS EVERY 12 HOURS
Status: DISCONTINUED | OUTPATIENT
Start: 2025-03-01 | End: 2025-03-03

## 2025-03-01 RX ORDER — METOPROLOL TARTRATE 25 MG/1
25 TABLET, FILM COATED ORAL 2 TIMES DAILY
Status: DISCONTINUED | OUTPATIENT
Start: 2025-03-01 | End: 2025-03-06 | Stop reason: HOSPADM

## 2025-03-01 RX ORDER — FUROSEMIDE 10 MG/ML
40 INJECTION INTRAMUSCULAR; INTRAVENOUS ONCE
Status: COMPLETED | OUTPATIENT
Start: 2025-03-01 | End: 2025-03-01

## 2025-03-01 RX ORDER — IPRATROPIUM BROMIDE AND ALBUTEROL SULFATE 2.5; .5 MG/3ML; MG/3ML
SOLUTION RESPIRATORY (INHALATION)
Status: COMPLETED
Start: 2025-03-01 | End: 2025-03-01

## 2025-03-01 RX ORDER — METOPROLOL TARTRATE 1 MG/ML
5 INJECTION, SOLUTION INTRAVENOUS
Status: DISCONTINUED | OUTPATIENT
Start: 2025-03-01 | End: 2025-03-06 | Stop reason: HOSPADM

## 2025-03-01 RX ORDER — IPRATROPIUM BROMIDE AND ALBUTEROL SULFATE 2.5; .5 MG/3ML; MG/3ML
3 SOLUTION RESPIRATORY (INHALATION)
Status: DISCONTINUED | OUTPATIENT
Start: 2025-03-01 | End: 2025-03-06 | Stop reason: HOSPADM

## 2025-03-01 RX ADMIN — ONDANSETRON 4 MG: 2 INJECTION INTRAMUSCULAR; INTRAVENOUS at 15:51

## 2025-03-01 RX ADMIN — DIGOXIN 62.5 MCG: 0.12 TABLET ORAL at 05:00

## 2025-03-01 RX ADMIN — SODIUM CHLORIDE, POTASSIUM CHLORIDE, SODIUM LACTATE AND CALCIUM CHLORIDE: 600; 310; 30; 20 INJECTION, SOLUTION INTRAVENOUS at 14:28

## 2025-03-01 RX ADMIN — ACETAMINOPHEN 1000 MG: 500 TABLET ORAL at 12:00

## 2025-03-01 RX ADMIN — OXYCODONE HYDROCHLORIDE 5 MG: 5 TABLET ORAL at 01:53

## 2025-03-01 RX ADMIN — FINASTERIDE 5 MG: 5 TABLET, FILM COATED ORAL at 04:59

## 2025-03-01 RX ADMIN — HEPARIN SODIUM 5000 UNITS: 5000 INJECTION, SOLUTION INTRAVENOUS; SUBCUTANEOUS at 14:20

## 2025-03-01 RX ADMIN — SODIUM CHLORIDE, POTASSIUM CHLORIDE, SODIUM LACTATE AND CALCIUM CHLORIDE: 600; 310; 30; 20 INJECTION, SOLUTION INTRAVENOUS at 04:52

## 2025-03-01 RX ADMIN — HYDROMORPHONE HYDROCHLORIDE 0.25 MG: 1 INJECTION, SOLUTION INTRAMUSCULAR; INTRAVENOUS; SUBCUTANEOUS at 15:39

## 2025-03-01 RX ADMIN — METOPROLOL TARTRATE 25 MG: 25 TABLET, FILM COATED ORAL at 22:59

## 2025-03-01 RX ADMIN — EZETIMIBE 10 MG: 10 TABLET ORAL at 05:00

## 2025-03-01 RX ADMIN — SENNOSIDES AND DOCUSATE SODIUM 2 TABLET: 50; 8.6 TABLET ORAL at 17:41

## 2025-03-01 RX ADMIN — ACETAMINOPHEN 1000 MG: 500 TABLET ORAL at 22:59

## 2025-03-01 RX ADMIN — OXYCODONE HYDROCHLORIDE 5 MG: 5 TABLET ORAL at 09:44

## 2025-03-01 RX ADMIN — AMLODIPINE BESYLATE 5 MG: 10 TABLET ORAL at 04:57

## 2025-03-01 RX ADMIN — ACETAMINOPHEN 1000 MG: 500 TABLET ORAL at 05:01

## 2025-03-01 RX ADMIN — TAMSULOSIN HYDROCHLORIDE 0.4 MG: 0.4 CAPSULE ORAL at 04:59

## 2025-03-01 RX ADMIN — BENAZEPRIL HYDROCHLORIDE 20 MG: 20 TABLET, FILM COATED ORAL at 04:59

## 2025-03-01 RX ADMIN — LABETALOL HYDROCHLORIDE 10 MG: 5 INJECTION INTRAVENOUS at 15:41

## 2025-03-01 RX ADMIN — LEVOTHYROXINE SODIUM 100 MCG: 0.1 TABLET ORAL at 05:00

## 2025-03-01 RX ADMIN — HEPARIN SODIUM 5000 UNITS: 5000 INJECTION, SOLUTION INTRAVENOUS; SUBCUTANEOUS at 21:50

## 2025-03-01 RX ADMIN — HEPARIN SODIUM 5000 UNITS: 5000 INJECTION, SOLUTION INTRAVENOUS; SUBCUTANEOUS at 05:01

## 2025-03-01 RX ADMIN — IPRATROPIUM BROMIDE AND ALBUTEROL SULFATE 3 ML: .5; 2.5 SOLUTION RESPIRATORY (INHALATION) at 15:33

## 2025-03-01 RX ADMIN — OXYCODONE HYDROCHLORIDE 5 MG: 5 TABLET ORAL at 20:24

## 2025-03-01 RX ADMIN — FUROSEMIDE 40 MG: 10 INJECTION, SOLUTION INTRAVENOUS at 21:50

## 2025-03-01 RX ADMIN — IOHEXOL 90 ML: 350 INJECTION, SOLUTION INTRAVENOUS at 16:45

## 2025-03-01 RX ADMIN — OMEPRAZOLE 40 MG: 20 CAPSULE, DELAYED RELEASE ORAL at 04:59

## 2025-03-01 RX ADMIN — ROSUVASTATIN CALCIUM 40 MG: 20 TABLET, FILM COATED ORAL at 04:58

## 2025-03-01 ASSESSMENT — ENCOUNTER SYMPTOMS
NAUSEA: 0
CARDIOVASCULAR NEGATIVE: 1
EYES NEGATIVE: 1
NEUROLOGICAL NEGATIVE: 1
VOMITING: 0
ABDOMINAL PAIN: 1
CONSTIPATION: 0
MUSCULOSKELETAL NEGATIVE: 1
CONSTITUTIONAL NEGATIVE: 1
RESPIRATORY NEGATIVE: 1
DIARRHEA: 0
PSYCHIATRIC NEGATIVE: 1

## 2025-03-01 ASSESSMENT — FIBROSIS 4 INDEX: FIB4 SCORE: 6.57

## 2025-03-01 ASSESSMENT — PAIN DESCRIPTION - PAIN TYPE
TYPE: ACUTE PAIN

## 2025-03-01 ASSESSMENT — PATIENT HEALTH QUESTIONNAIRE - PHQ9
SUM OF ALL RESPONSES TO PHQ9 QUESTIONS 1 AND 2: 0
1. LITTLE INTEREST OR PLEASURE IN DOING THINGS: NOT AT ALL
2. FEELING DOWN, DEPRESSED, IRRITABLE, OR HOPELESS: NOT AT ALL

## 2025-03-01 NOTE — CARE PLAN
The patient is Stable - Low risk of patient condition declining or worsening    Shift Goals  Clinical Goals: MRCP, advance diet, mobility  Patient Goals: Pain management, MRCP, plan  Family Goals: Plan of care    Progress made toward(s) clinical / shift goals:    Problem: Knowledge Deficit - Standard  Goal: Patient and family/care givers will demonstrate understanding of plan of care, disease process/condition, diagnostic tests and medications  Description: Target End Date:  1-3 days or as soon as patient condition allows    Document in Patient Education    1.  Patient and family/caregiver oriented to unit, equipment, visitation policy and means for communicating concern  2.  Complete/review Learning Assessment  3.  Assess knowledge level of disease process/condition, treatment plan, diagnostic tests and medications  4.  Explain disease process/condition, treatment plan, diagnostic tests and medications  Outcome: Progressing  Note: Plan of care discussed with patient. He remains alert and oriented times four. NPO diet continued at this time. Currently awaiting MRCP, cholecystectomy, and ECHO. Pain management regimen in place. Appropriate at this time     Problem: Fall Risk  Goal: Patient will remain free from falls  Description: Target End Date:  Prior to discharge or change in level of care    Document interventions on the [facility] Fall Risk Assessment    1.  Assess for fall risk factors  2.  Implement fall precautions  Outcome: Progressing  Note: Patient has remained free of falls this shift. Instructed patient to use call light for help. Call light always placed within reach when leaving room. Patient's room has been cleared of clutter such as cords and extra chairs. Patient offered to mobilize in hallway with staff. Refused due to pain, will continue to offer within shift     Problem: Pain - Standard  Goal: Alleviation of pain or a reduction in pain to the patient’s comfort goal  Description: Target End  Date:  Prior to discharge or change in level of care    Document on Vitals flowsheet    1.  Document pain using the appropriate pain scale per order or unit policy  2.  Educate and implement non-pharmacologic comfort measures (i.e. relaxation, distraction, massage, cold/heat therapy, etc.)  3.  Pain management medications as ordered  4.  Reassess pain after pain med administration per policy  5.  If opiods administered assess patient's response to pain medication is appropriate per POSS sedation scale  6.  Follow pain management plan developed in collaboration with patient and interdisciplinary team (including palliative care or pain specialists if applicable)  Outcome: Progressing  Note: PRN pain medications in use for pain control.  Pain remains in L lower quadrant        Patient is not progressing towards the following goals:

## 2025-03-01 NOTE — PROGRESS NOTES
[facility] Internal Medicine Daily Progress Note    Date of Service  3/1/2025    [employer] Team: R KARLO Bolanos Team   Attending: Padmini Hinkle M.d.  Senior Resident: Dr. Otis Melendez  Intern:  Dr. Roge Sol  Contact Number: [phone]    Chief Complaint  Enrico Cohen is a 84 y.o. male admitted 2/27/2025 with gallstone pancreatitis    Hospital Course  Patient is an 84-year-old male with a past medical history of hypothyroidism, HTN, HLD, CAD status post 16 stents, PSA, GERD, aortic stenosis status post TAVR, CKD 3A, BPH that presented with epigastric pain with nausea and vomiting. He went golfing during the day, then came home with worsening pain. On arrival to ED he had one episode of vomiting. No history of similar pain/symptoms.     WBC 15.6, hemoglobin 15.0, , sodium 141, potassium 3.6, bicarb 25, anion gap 15, BUN 21, creatinine 1.5, , ALT 69, alk phos 110, T. bili 1.8, magnesium 1.7, lipase greater than 250, troponin 67>> 48.  Received hydromorphone 1 mg IV x 1, magnesium 2 g IV x 1, nitroglycerin 0.4 mg SL x 1, NS 1 L bolus x 1, Zofran 4 mg IV x 1. CT abdomen pelvis with contrast showed mild intra and extrahepatic biliary dilatation with gallstone present with distention of the gallbladder and mild peripancreatic stranding suggesting pancreatitis. CT PE without PE, otherwise unremarkable.  Chest x-ray without acute abnormality.    Interval Problem Update  Pt complains of left-sided on-and-off colicky 7/10 abdominal pain. He denies nausea, vomiting, diarrhea, constipation. He states he would like to get the surgery done due to the pain, but I explained that surgery will be able to determine plan of course after MRCP results.  - Continue IV fluids  - Clear liquid diet  - Pending cardiac echo    I have discussed this patient's plan of care and discharge plan at IDT rounds today with Case Management, Nursing, Nursing leadership, and other members of the IDT  team.    Consultants/Specialty  general surgery    Code Status  Full Code    Disposition  The patient is not medically cleared for discharge to home or a post-acute facility.      I have placed the appropriate orders for post-discharge needs.    Review of Systems  Review of Systems   Constitutional: Negative.    HENT: Negative.     Eyes: Negative.    Respiratory: Negative.     Cardiovascular: Negative.    Gastrointestinal:  Positive for abdominal pain. Negative for constipation, diarrhea, nausea and vomiting.   Genitourinary: Negative.    Musculoskeletal: Negative.    Skin: Negative.    Neurological: Negative.    Endo/Heme/Allergies: Negative.    Psychiatric/Behavioral: Negative.          Physical Exam  Temp:  [36.5 °C (97.7 °F)-36.7 °C (98.1 °F)] 36.6 °C (97.9 °F)  Pulse:  [60-74] 60  Resp:  [16-18] 17  BP: (136-163)/(60-72) 148/66  SpO2:  [89 %-93 %] 92 %    Physical Exam  Constitutional:       Appearance: He is obese.   HENT:      Head: Normocephalic.   Cardiovascular:      Rate and Rhythm: Normal rate and regular rhythm.   Pulmonary:      Effort: Pulmonary effort is normal.      Breath sounds: Normal breath sounds.   Abdominal:      General: There is distension.      Tenderness: There is abdominal tenderness. There is no guarding or rebound.      Comments: Hyperactive bowel sounds. Tenderness to left lower abdominal area. Rojo's sign negative.   Musculoskeletal:         General: Normal range of motion.      Cervical back: Normal range of motion.   Skin:     General: Skin is warm.   Neurological:      General: No focal deficit present.      Mental Status: He is alert.   Psychiatric:         Mood and Affect: Mood normal.         Fluids    Intake/Output Summary (Last 24 hours) at 3/1/2025 1404  Last data filed at 3/1/2025 1136  Gross per 24 hour   Intake 1375 ml   Output 600 ml   Net 775 ml       Laboratory  Recent Labs     02/27/25 2025 02/28/25  0139 03/01/25  0726   WBC 20.1* 14.5* 12.9*   RBC 4.96 4.46*  4.11*   HEMOGLOBIN 14.3 12.7* 12.2*   HEMATOCRIT 43.0 39.1* 38.0*   MCV 86.7 87.7 92.5   MCH 28.8 28.5 29.7   MCHC 33.3 32.5 32.1*   RDW 42.6 43.0 47.9   PLATELETCT 251 229 203   MPV 11.2 11.2 12.4     Recent Labs     02/27/25 2025 02/28/25  0139 03/01/25  0726   SODIUM 138 139 135   POTASSIUM 4.6 5.4 4.3   CHLORIDE 104 106 104   CO2 21 24 23   GLUCOSE 139* 126* 100*   BUN 19 18 16   CREATININE 1.35 1.40 1.03   CALCIUM 9.2 8.6 8.5     Recent Labs     02/27/25 2025   INR 1.06         Recent Labs     02/27/25 2025   TRIGLYCERIDE 70   HDL 57   *       Imaging  [id number]/O    (Results Pending)   EC-ECHOCARDIOGRAM COMPLETE W/O CONT    (Results Pending)        Assessment/Plan  Problem Representation:    * Gallstone pancreatitis- (present on admission)  Assessment & Plan  (+) epigastric pain with nausea and vomiting.  Elevated lipase. CT abdomen pelvis with contrast showing mild intra and extrahepatic biliary dilatation with gallstone present with distention of the gallbladder and mild peripancreatic stranding suggesting pancreatitis.   WBC and LFTs decreased subsequently, indication that stone possible passed.  - MRCP pending read  - General surgery requested an echo for risk stratification, but at this time they do not recommend surgery due to his age and cardiac risk factors. However, if surgically indicated, we will consult cardiology for further preop evaluation.  - Clear liquid diet  - Alcohol cessation  - LR 125cc/hr  - Pain regimen  - Antiemetics PRN    Paroxysmal atrial fibrillation (HCC)- (present on admission)  Assessment & Plan  Not in RVR  - Continue home digoxin  - Hold home rivaroxaban    Benign prostatic hyperplasia- (present on admission)  Assessment & Plan  - Continue home tamsulosin    Stage 3a chronic kidney disease (CKD)- (present on admission)  Assessment & Plan  Baseline 1.3.  - Avoid nephrotoxic medications  - Renally dose medications as appropriate    Coronary artery disease  involving native coronary artery of native heart without angina pectoris- (present on admission)  Assessment & Plan  Status post 16 stents  - Hold home Plavix and rivaroxaban pending likely procedure    Obstructive sleep apnea- (present on admission)  Assessment & Plan  - CPAP    History of transcatheter aortic valve replacement (TAVR)- (present on admission)  Assessment & Plan  Status post TAVR.    GERD (gastroesophageal reflux disease)- (present on admission)  Assessment & Plan  -Continue home omeprazole    Mixed hyperlipidemia- (present on admission)  Assessment & Plan    - Continue home Rosuvastatin    Essential hypertension- (present on admission)  Assessment & Plan  - Continue home amlodipine-benazepril  - As needed labetalol         VTE prophylaxis: heparin ppx    I have performed a physical exam and reviewed and updated ROS and Plan today (3/1/2025). In review of yesterday's note (2/28/2025), there are no changes except as documented above.

## 2025-03-01 NOTE — PROGRESS NOTES
DATE: 3/1/2025    Hospital Day3  gallstone pancreatitis .    INTERVAL EVENTS:  MRCP performed last night, read pending this morning  Echocardiogram pending today  Patient continues to have pain in the epigastrium and vague generalized abdominal pain.  Overall says it is somewhat better than yesterday.  T. bili 2 from 1.8.  Leukocytosis continues to improve, now 12.9 from 14.5    REVIEW OF SYSTEMS:  Comprehensive review of systems is negative with the exception of the aforementioned HPI, PMH, and PSH bullets in accordance with CMS guidelines.    PHYSICAL EXAMINATION:  Vital Signs: BP (!) 152/63   Pulse 64   Temp 36.5 °C (97.7 °F) (Temporal)   Resp 18   Wt 81 kg (178 lb 9.2 oz)   SpO2 90%   General: Resting comfortably in bed in no apparent distress  Respiratory: Nonlabored breathing on 2 L/min via nasal cannula  Cardiovascular: Regular rate  Abdomen: Mildly distended, soft, tenderness to palpation in the epigastrium and the right upper quadrant    LABORATORY VALUES:  Recent Labs     02/27/25 2025 02/28/25 0139 03/01/25  0726   WBC 20.1* 14.5* 12.9*   RBC 4.96 4.46* 4.11*   HEMOGLOBIN 14.3 12.7* 12.2*   HEMATOCRIT 43.0 39.1* 38.0*   MCV 86.7 87.7 92.5   MCH 28.8 28.5 29.7   MCHC 33.3 32.5 32.1*   RDW 42.6 43.0 47.9   PLATELETCT 251 229 203   MPV 11.2 11.2 12.4     Recent Labs     02/27/25 2025 02/28/25 0139 03/01/25  0726   SODIUM 138 139 135   POTASSIUM 4.6 5.4 4.3   CHLORIDE 104 106 104   CO2 21 24 23   GLUCOSE 139* 126* 100*   BUN 19 18 16   CREATININE 1.35 1.40 1.03   CALCIUM 9.2 8.6 8.5     Recent Labs     02/27/25 2025 02/28/25 0139 03/01/25  0726   ASTSGOT 384* 257* 114*   ALTSGPT 242* 216* 157*   TBILIRUBIN 2.3* 1.8* 2.0*   ALKPHOSPHAT 115* 95 107*   GLOBULIN 3.0 2.5 2.7   INR 1.06  --   --      Recent Labs     02/27/25 2025   INR 1.06       IMAGING:  [id number]-W/O    (Results Pending)   EC-ECHOCARDIOGRAM COMPLETE W/O CONT    (Results Pending)       ASSESSMENT AND PLAN:  84-year-old male with  likely gallstone pancreatitis.  Remains tender, though somewhat improved on interval exam.  MRCP completed overnight, workup is pending.  T. bili is 2 from 1.8 today, relatively low index of suspicion for choledocholithiasis, will confirm with MRCP  Patient stresses that he would like to have a cholecystectomy during this hospitalization in order to prevent recurrent pancreatitis in the future.  While I think this is a possibility, strongly suggest a full cardiac workup given his significant cardiac history and limited functional status.  Echo is pending today.  Once results are available please obtain a formal cardiology consult for perioperative risk assessment and identification of modifiable risk factors  * Gallstone pancreatitis- (present on admission)  Assessment & Plan  Pancreatitis on admission: Remains tender.  From a biliary standpoint, would recommend cholecystectomy during this hospitalization.  However, some concern for significant perioperative cardiac risk.  Will obtain echo and cardiac consult.  Once this is complete, we will have risk-benefit discussion with the patient      Coronary artery disease involving native coronary artery of native heart without angina pectoris- (present on admission)  Assessment & Plan  History of severe coronary artery disease.  Patient states he has 16 stents.  Last echo in 2023 showed LVEF 45%.  Repeat echo and obtain cardiology consult for perioperative risk assessment and identification of possible modifiable risk factors prior to surgery             ____________________________________     Janeth Bey M.D.    DD: 3/1/2025  10:03 AM

## 2025-03-01 NOTE — CARE PLAN
The patient is Stable - Low risk of patient condition declining or worsening    Shift Goals  Clinical Goals: Diet instruction, MRCP  Patient Goals: Rest, pain management  Family Goals: SHANNON    Progress made toward(s) clinical / shift goals:    Problem: Knowledge Deficit - Standard  Goal: Patient and family/care givers will demonstrate understanding of plan of care, disease process/condition, diagnostic tests and medications  Description: Target End Date:  1-3 days or as soon as patient condition allows    Document in Patient Education    1.  Patient and family/caregiver oriented to unit, equipment, visitation policy and means for communicating concern  2.  Complete/review Learning Assessment  3.  Assess knowledge level of disease process/condition, treatment plan, diagnostic tests and medications  4.  Explain disease process/condition, treatment plan, diagnostic tests and medications  Outcome: Progressing     Problem: Fall Risk  Goal: Patient will remain free from falls  Description: Target End Date:  Prior to discharge or change in level of care    Document interventions on the [facility] Fall Risk Assessment    1.  Assess for fall risk factors  2.  Implement fall precautions  Outcome: Progressing  Note: Patient has remained free of falls this shift. Instructed patient to use call light for help. Call light always placed within reach when leaving room. Patient's room has been cleared of clutter such as cords and extra chairs.      Problem: Pain - Standard  Goal: Alleviation of pain or a reduction in pain to the patient’s comfort goal  Description: Target End Date:  Prior to discharge or change in level of care    Document on Vitals flowsheet    1.  Document pain using the appropriate pain scale per order or unit policy  2.  Educate and implement non-pharmacologic comfort measures (i.e. relaxation, distraction, massage, cold/heat therapy, etc.)  3.  Pain management medications as ordered  4.  Reassess pain after  pain med administration per policy  5.  If opiods administered assess patient's response to pain medication is appropriate per POSS sedation scale  6.  Follow pain management plan developed in collaboration with patient and interdisciplinary team (including palliative care or pain specialists if applicable)  Outcome: Progressing  Note: PRN pain medications in use for pain control.         Patient is not progressing towards the following goals:

## 2025-03-01 NOTE — ASSESSMENT & PLAN NOTE
History of severe coronary artery disease.  Patient states he has 16 stents.  Last echo in 2023 showed LVEF 45%.  Repeat echo performed, patient is low to intermediate risk for perioperative complications per cardiology  3/5 Xarelto started  Hb stable

## 2025-03-01 NOTE — CONSULTS
DATE OF CONSULTATION:  2/28/2025     REFERRING PHYSICIAN:   DAVIDA Hinkle     CONSULTING PHYSICIAN:  Janeth Bey M.D.     REASON FOR CONSULTATION:  I have been asked by Dr. Hinkle to see the patient in surgical consultation for evaluation of abdominal pain.    HISTORY OF PRESENT ILLNESS: The patient is an 84-year-old male with a complex medical history notable for hypothyroidism, hypertension, hyperlipidemia, severe coronary artery disease (patient states he had 16 stents, on Zetia and digoxin, last echo June 2023 showed normal left ventricle with estimated LVEF of 45 to 50%, and mild to moderate perivalvular TAVR leak), TAVR, CKD stage IIIa.  The patient presented to the ED on 227 with approximately 1 day of epigastric pain.     On presentation, the patient was hemodynamically stable.  Labs on presentation were notable for leukocytosis to 15.6 which increased to 20, and has since down trended to 14.5.  Troponin on arrival was 67 which down trended to 48.  Lipase on arrival was greater than 250, which repeated at greater than 3000.  Lactic acid was 2.4 with improvement to 1.3.  Total bilirubin was 2.3 with improvement this morning to 1.8.    CT of the abdomen and pelvis was obtained that showed a distended gallbladder without pericholecystic fluid or inflammation.  There was some intra and extrahepatic ductal dilatation as well as mild peripancreatic stranding.    On exam today, the patient states that he feels better, but he continues to have some epigastric pain    He states that he can walk up a flight of stairs, though he gets winded towards the top.  He cannot walk far distances without getting winded.  While he does play golf, he has special accommodations and needs to drive up directly to his ball to limit walking to prevent respiratory distress    PAST MEDICAL HISTORY:  has a past medical history of Anticoagulant long-term use, Aortic stenosis, severe, ASCVD (arteriosclerotic cardiovascular disease), BPH  (benign prostatic hyperplasia), CAD (coronary artery disease), Elevated PSA, Esophageal reflux, Gout flare, History of transcatheter aortic valve replacement (TAVR), Hyperlipidemia, Hypertension, Hypothyroid, PVD (peripheral vascular disease) (HCC), and Sleep apnea.    PAST SURGICAL HISTORY:  has a past surgical history that includes appendectomy (2006); zzz cardiac cath (2003 to 2013); knee arthrotomy (Right); bunionectomy (Bilateral); inguinal hernia repair (Left); knee arthroscopy (06/15/2015); fusion, spine, lumbar, plif; arthroplasty (Right); and aortic valve replacement (N/A).    ALLERGIES: No Known Allergies    CURRENT MEDICATIONS:    Home Medications       Reviewed by Ayana Diamond (Pharmacy Tech) on 02/28/25 at 1304  Med List Status: Complete     Medication Last Dose Status   allopurinol (ZYLOPRIM) 300 MG Tab 2/27/2025 Active   amlodipine-benazepril (LOTREL) 5-20 MG per capsule 2/27/2025 Active   clopidogrel (PLAVIX) 75 MG Tab 2/27/2025 Active   digoxin (LANOXIN) 125 MCG Tab 2/27/2025 Active   ezetimibe (ZETIA) 10 MG Tab 2/27/2025 Active   febuxostat (ULORIC) 40 MG Tab 2/27/2025 Active   finasteride (PROSCAR) 5 MG Tab 2/27/2025 Active   furosemide (LASIX) 20 MG Tab 2/27/2025 Active   levothyroxine (SYNTHROID) 100 MCG Tab 2/27/2025 Active   magnesium oxide (MAG-OX) 400 MG Tab tablet 2/27/2025 Active   metoprolol SR (TOPROL XL) 50 MG TABLET SR 24 HR 2/27/2025 Active   multivitamin (THERAGRAN) Tab 2/27/2025 Active   nitroGLYCERIN (NITROSTAT) 0.3 MG SL tablet Unknown Active   Omega-3 Fatty Acids (FISH OIL) 1000 MG Cap capsule 2/27/2025 Active   omeprazole (PRILOSEC) 40 MG delayed-release capsule 2/27/2025 Active   potassium chloride ER (KLOR-CON) 10 MEQ tablet 2/27/2025 Active   rivaroxaban (XARELTO) 20 MG Tab tablet 2/27/2025 Active   rosuvastatin (CRESTOR) 40 MG tablet 2/27/2025 Active   tamsulosin (FLOMAX) 0.4 MG capsule 2/27/2025 Active                  Audit from Redirected Encounters    **Home  medications have not yet been reviewed for this encounter**         FAMILY HISTORY: family history is not on file.    SOCIAL HISTORY:  reports that he quit smoking about 21 years ago. His smoking use included cigarettes. He started smoking about 71 years ago. He has a 25 pack-year smoking history. He has never used smokeless tobacco. He reports that he does not currently use alcohol. He reports that he does not use drugs.    REVIEW OF SYSTEMS: Comprehensive review of systems is negative with the exception of the aforementioned HPI, PMH, and PSH bullets in accordance with CMS guidelines.    PHYSICAL EXAMINATION:    General: Resting comfortably in bed, in no acute distress  Respiratory: Somewhat labored breathing wall conversing on 2 L/min via nasal cannula  Cardiovascular: Regular rate  Abdomen: Mildly distended epigastric tenderness to palpation.  Some right upper quadrant tenderness to deep palpation, negative Rojo sign    LABORATORY VALUES:   Recent Labs     02/27/25 1425 02/27/25 2025 02/28/25 0139   WBC 15.6* 20.1* 14.5*   RBC 5.14 4.96 4.46*   HEMOGLOBIN 15.0 14.3 12.7*   HEMATOCRIT 43.9 43.0 39.1*   MCV 85.4 86.7 87.7   MCH 29.2 28.8 28.5   MCHC 34.2 33.3 32.5   RDW 13.5 42.6 43.0   PLATELETCT 284 251 229   MPV 11.2* 11.2 11.2     Recent Labs     02/27/25 1425 02/27/25 2025 02/28/25  0139   SODIUM 141 138 139   POTASSIUM 3.6 4.6 5.4   CHLORIDE 105 104 106   CO2 25 21 24   GLUCOSE 113* 139* 126*   BUN 21* 19 18   CREATININE 1.5* 1.35 1.40   CALCIUM 9.3 9.2 8.6     Recent Labs     02/27/25  1425 02/27/25 2025 02/28/25  0139   ASTSGOT 132* 384* 257*   ALTSGPT 69 242* 216*   TBILIRUBIN 1.8* 2.3* 1.8*   ALKPHOSPHAT 110 115* 95   GLOBULIN  --  3.0 2.5   INR  --  1.06  --      Recent Labs     02/27/25 2025   INR 1.06        IMAGING:   [id number]/O    (Results Pending)   EC-ECHOCARDIOGRAM COMPLETE W/O CONT    (Results Pending)       ASSESSMENT AND PLAN:   84-year-old male with complex medical history,  especially cardiac history who presents with gallstone pancreatitis.  He currently has active pancreatitis and is tender in the epigastrium.  We discussed the possibility of interval cholecystectomy once his symptoms improved.  Patient's functional status is borderline, and he likely carries high perioperative cardiac risk.    As such would want to obtain echocardiogram prior to consideration of surgery, as well as formal cardiology consult for risk stratification once echocardiogram is available.  Once that is completed, we will further discuss risk-benefit discussion with the patient regarding possible interval cholecystectomy    No new Assessment & Plan notes have been filed under this hospital service since the last note was generated.  Service: Surgery General      DISPOSITION: Medical evaluation and admission. The patient was admitted to the Medical Service prior to surgical consultation. [facility] will follow.     ____________________________________     Janeth Bey M.D.    DD: 2/28/2025  5:27 PM    AAST Grading System for EGS Conditions  ACS NSQIP Surgical Risk Calculator

## 2025-03-01 NOTE — CARE PLAN
The patient is Stable - Low risk of patient condition declining or worsening    Shift Goals  Clinical Goals: MRCP tomorrow, Pain management  Patient Goals: pain control, and rest  Family Goals: SHANNON    Progress made toward(s) clinical / shift goals:     Problem: Fall Risk  Goal: Patient will remain free from falls  Outcome: Progressing  Note: Patient remained free from falls. All fall precautions in place. Patient educated the need to call when needed assistance, patient verbalized understanding. Call light and personal belongings within reach.       Problem: Pain - Standard  Goal: Alleviation of pain or a reduction in pain to the patient’s comfort goal  Outcome: Progressing  Note: Document pain using the appropriate pain scale per order or unit policy    Educate and implement non-pharmacologic comfort measures (i.e. relaxation, distraction, massage, cold/heat therapy, etc.)   Pain management medications as ordered   Reassess pain after pain med administration per policy   If opiods administered assess patient's response to pain medication is appropriate per POSS sedation scale     Patient complaint of abdominal pain, Schedule Tylenol effective, encourage non pharmacological intervention, provide conducive environment to sleep.        Patient is not progressing towards the following goals:

## 2025-03-01 NOTE — ASSESSMENT & PLAN NOTE
Pancreatitis on admission: Remains tender.  From a biliary standpoint, would recommend cholecystectomy during this hospitalization.  3/5 robotic cholecystectomy  Recovering appropriately  Advance diet as tolerated  Clear for discharge from a surgical perspective

## 2025-03-01 NOTE — PROGRESS NOTES
TELEMETRY STRIP SUMMARY:    Rhythm: SR  HR: 63-85 bpm  Ectopy: fPVC, fPAC, rTrig, rBig, oCoup    [place]: 0.19  QRS: 0.10  QT: 0.39

## 2025-03-02 ENCOUNTER — APPOINTMENT (OUTPATIENT)
Dept: CARDIOLOGY | Facility: MEDICAL CENTER | Age: 85
DRG: 417 | End: 2025-03-02
Attending: HOSPITALIST
Payer: COMMERCIAL

## 2025-03-02 LAB
ALBUMIN SERPL BCP-MCNC: 3 G/DL (ref 3.2–4.9)
ALBUMIN/GLOB SERPL: 1.1 G/DL
ALP SERPL-CCNC: 128 U/L (ref 30–99)
ALT SERPL-CCNC: 107 U/L (ref 2–50)
ANION GAP SERPL CALC-SCNC: 11 MMOL/L (ref 7–16)
AST SERPL-CCNC: 67 U/L (ref 12–45)
BILIRUB SERPL-MCNC: 0.9 MG/DL (ref 0.1–1.5)
BUN SERPL-MCNC: 18 MG/DL (ref 8–22)
CALCIUM ALBUM COR SERPL-MCNC: 8.4 MG/DL (ref 8.5–10.5)
CALCIUM SERPL-MCNC: 7.6 MG/DL (ref 8.5–10.5)
CHLORIDE SERPL-SCNC: 103 MMOL/L (ref 96–112)
CO2 SERPL-SCNC: 21 MMOL/L (ref 20–33)
CREAT SERPL-MCNC: 1.14 MG/DL (ref 0.5–1.4)
EKG IMPRESSION: NORMAL
ERYTHROCYTE [DISTWIDTH] IN BLOOD BY AUTOMATED COUNT: 45.8 FL (ref 35.9–50)
GFR SERPLBLD CREATININE-BSD FMLA CKD-EPI: 62 ML/MIN/1.73 M 2
GLOBULIN SER CALC-MCNC: 2.7 G/DL (ref 1.9–3.5)
GLUCOSE SERPL-MCNC: 110 MG/DL (ref 65–99)
HCT VFR BLD AUTO: 35.3 % (ref 42–52)
HCV RNA SERPL NAA+PROBE-ACNC: NOT DETECTED IU/ML
HCV RNA SERPL NAA+PROBE-LOG IU: NOT DETECTED LOG IU/ML
HCV RNA SERPL QL NAA+PROBE: NOT DETECTED
HGB BLD-MCNC: 11.5 G/DL (ref 14–18)
LV EJECT FRACT MOD 2C 99903: 56
LV EJECT FRACT MOD 4C 99902: 60.8
LV EJECT FRACT MOD BP 99901: 58.84
MCH RBC QN AUTO: 28.5 PG (ref 27–33)
MCHC RBC AUTO-ENTMCNC: 32.6 G/DL (ref 32.3–36.5)
MCV RBC AUTO: 87.6 FL (ref 81.4–97.8)
PLATELET # BLD AUTO: 185 K/UL (ref 164–446)
PMV BLD AUTO: 12.2 FL (ref 9–12.9)
POTASSIUM SERPL-SCNC: 3.9 MMOL/L (ref 3.6–5.5)
PROT SERPL-MCNC: 5.7 G/DL (ref 6–8.2)
RBC # BLD AUTO: 4.03 M/UL (ref 4.7–6.1)
SODIUM SERPL-SCNC: 135 MMOL/L (ref 135–145)
WBC # BLD AUTO: 9.8 K/UL (ref 4.8–10.8)

## 2025-03-02 PROCEDURE — 85027 COMPLETE CBC AUTOMATED: CPT

## 2025-03-02 PROCEDURE — 700102 HCHG RX REV CODE 250 W/ 637 OVERRIDE(OP): Performed by: INTERNAL MEDICINE

## 2025-03-02 PROCEDURE — 93306 TTE W/DOPPLER COMPLETE: CPT

## 2025-03-02 PROCEDURE — 770000 HCHG ROOM/CARE - INTERMEDIATE ICU *

## 2025-03-02 PROCEDURE — 700111 HCHG RX REV CODE 636 W/ 250 OVERRIDE (IP): Performed by: STUDENT IN AN ORGANIZED HEALTH CARE EDUCATION/TRAINING PROGRAM

## 2025-03-02 PROCEDURE — 99222 1ST HOSP IP/OBS MODERATE 55: CPT | Performed by: INTERNAL MEDICINE

## 2025-03-02 PROCEDURE — 99223 1ST HOSP IP/OBS HIGH 75: CPT | Mod: 25 | Performed by: INTERNAL MEDICINE

## 2025-03-02 PROCEDURE — 93306 TTE W/DOPPLER COMPLETE: CPT | Mod: 26 | Performed by: INTERNAL MEDICINE

## 2025-03-02 PROCEDURE — 80053 COMPREHEN METABOLIC PANEL: CPT

## 2025-03-02 PROCEDURE — 93010 ELECTROCARDIOGRAM REPORT: CPT | Performed by: INTERNAL MEDICINE

## 2025-03-02 PROCEDURE — A9270 NON-COVERED ITEM OR SERVICE: HCPCS | Performed by: INTERNAL MEDICINE

## 2025-03-02 PROCEDURE — A9270 NON-COVERED ITEM OR SERVICE: HCPCS | Performed by: STUDENT IN AN ORGANIZED HEALTH CARE EDUCATION/TRAINING PROGRAM

## 2025-03-02 PROCEDURE — 93005 ELECTROCARDIOGRAM TRACING: CPT | Mod: TC | Performed by: INTERNAL MEDICINE

## 2025-03-02 PROCEDURE — 700102 HCHG RX REV CODE 250 W/ 637 OVERRIDE(OP): Performed by: HOSPITALIST

## 2025-03-02 PROCEDURE — 700111 HCHG RX REV CODE 636 W/ 250 OVERRIDE (IP): Mod: JZ | Performed by: HOSPITALIST

## 2025-03-02 PROCEDURE — A9270 NON-COVERED ITEM OR SERVICE: HCPCS | Performed by: HOSPITALIST

## 2025-03-02 PROCEDURE — 700117 HCHG RX CONTRAST REV CODE 255: Performed by: HOSPITALIST

## 2025-03-02 PROCEDURE — 700102 HCHG RX REV CODE 250 W/ 637 OVERRIDE(OP): Performed by: STUDENT IN AN ORGANIZED HEALTH CARE EDUCATION/TRAINING PROGRAM

## 2025-03-02 PROCEDURE — 700111 HCHG RX REV CODE 636 W/ 250 OVERRIDE (IP): Performed by: INTERNAL MEDICINE

## 2025-03-02 PROCEDURE — 99231 SBSQ HOSP IP/OBS SF/LOW 25: CPT | Performed by: NURSE PRACTITIONER

## 2025-03-02 PROCEDURE — 99233 SBSQ HOSP IP/OBS HIGH 50: CPT | Performed by: HOSPITALIST

## 2025-03-02 PROCEDURE — 700105 HCHG RX REV CODE 258: Performed by: INTERNAL MEDICINE

## 2025-03-02 RX ORDER — FUROSEMIDE 10 MG/ML
20 INJECTION INTRAMUSCULAR; INTRAVENOUS ONCE
Status: COMPLETED | OUTPATIENT
Start: 2025-03-02 | End: 2025-03-02

## 2025-03-02 RX ORDER — POTASSIUM CHLORIDE 1500 MG/1
40 TABLET, EXTENDED RELEASE ORAL ONCE
Status: COMPLETED | OUTPATIENT
Start: 2025-03-02 | End: 2025-03-02

## 2025-03-02 RX ORDER — BISACODYL 10 MG
10 SUPPOSITORY, RECTAL RECTAL DAILY
Status: DISCONTINUED | OUTPATIENT
Start: 2025-03-02 | End: 2025-03-03

## 2025-03-02 RX ADMIN — METOPROLOL TARTRATE 25 MG: 25 TABLET, FILM COATED ORAL at 17:14

## 2025-03-02 RX ADMIN — HEPARIN SODIUM 5000 UNITS: 5000 INJECTION, SOLUTION INTRAVENOUS; SUBCUTANEOUS at 06:28

## 2025-03-02 RX ADMIN — POLYETHYLENE GLYCOL 3350 1 PACKET: 17 POWDER, FOR SOLUTION ORAL at 07:50

## 2025-03-02 RX ADMIN — METRONIDAZOLE 500 MG: 5 INJECTION, SOLUTION INTRAVENOUS at 06:40

## 2025-03-02 RX ADMIN — FINASTERIDE 5 MG: 5 TABLET, FILM COATED ORAL at 06:29

## 2025-03-02 RX ADMIN — AMLODIPINE BESYLATE 5 MG: 10 TABLET ORAL at 06:29

## 2025-03-02 RX ADMIN — EZETIMIBE 10 MG: 10 TABLET ORAL at 06:29

## 2025-03-02 RX ADMIN — ACETAMINOPHEN 1000 MG: 500 TABLET ORAL at 17:14

## 2025-03-02 RX ADMIN — DIGOXIN 62.5 MCG: 0.12 TABLET ORAL at 06:28

## 2025-03-02 RX ADMIN — METRONIDAZOLE 500 MG: 5 INJECTION, SOLUTION INTRAVENOUS at 01:08

## 2025-03-02 RX ADMIN — POTASSIUM CHLORIDE 40 MEQ: 1500 TABLET, EXTENDED RELEASE ORAL at 15:20

## 2025-03-02 RX ADMIN — ONDANSETRON 4 MG: 4 TABLET, ORALLY DISINTEGRATING ORAL at 08:17

## 2025-03-02 RX ADMIN — HUMAN ALBUMIN MICROSPHERES AND PERFLUTREN 3 ML: 10; .22 INJECTION, SOLUTION INTRAVENOUS at 14:00

## 2025-03-02 RX ADMIN — TAMSULOSIN HYDROCHLORIDE 0.4 MG: 0.4 CAPSULE ORAL at 06:29

## 2025-03-02 RX ADMIN — METRONIDAZOLE 500 MG: 5 INJECTION, SOLUTION INTRAVENOUS at 17:14

## 2025-03-02 RX ADMIN — LEVOTHYROXINE SODIUM 100 MCG: 0.1 TABLET ORAL at 06:29

## 2025-03-02 RX ADMIN — FUROSEMIDE 20 MG: 10 INJECTION, SOLUTION INTRAVENOUS at 15:20

## 2025-03-02 RX ADMIN — BENAZEPRIL HYDROCHLORIDE 20 MG: 20 TABLET, FILM COATED ORAL at 06:28

## 2025-03-02 RX ADMIN — OMEPRAZOLE 40 MG: 20 CAPSULE, DELAYED RELEASE ORAL at 06:28

## 2025-03-02 RX ADMIN — ACETAMINOPHEN 1000 MG: 500 TABLET ORAL at 06:29

## 2025-03-02 RX ADMIN — HEPARIN SODIUM 5000 UNITS: 5000 INJECTION, SOLUTION INTRAVENOUS; SUBCUTANEOUS at 21:43

## 2025-03-02 RX ADMIN — ROSUVASTATIN CALCIUM 40 MG: 20 TABLET, FILM COATED ORAL at 06:29

## 2025-03-02 RX ADMIN — CEFTRIAXONE SODIUM 2000 MG: 10 INJECTION, POWDER, FOR SOLUTION INTRAVENOUS at 07:50

## 2025-03-02 RX ADMIN — HEPARIN SODIUM 5000 UNITS: 5000 INJECTION, SOLUTION INTRAVENOUS; SUBCUTANEOUS at 13:37

## 2025-03-02 RX ADMIN — FUROSEMIDE 20 MG: 10 INJECTION, SOLUTION INTRAVENOUS at 10:42

## 2025-03-02 RX ADMIN — METOPROLOL TARTRATE 25 MG: 25 TABLET, FILM COATED ORAL at 06:30

## 2025-03-02 ASSESSMENT — ENCOUNTER SYMPTOMS
NERVOUS/ANXIOUS: 0
COUGH: 0
ORTHOPNEA: 0
VOMITING: 0
CHILLS: 0
DEPRESSION: 0
FEVER: 0
NERVOUS/ANXIOUS: 1
ABDOMINAL PAIN: 1
NEUROLOGICAL NEGATIVE: 1
LOSS OF CONSCIOUSNESS: 0
EYES NEGATIVE: 1
PND: 0
MYALGIAS: 1
DIZZINESS: 1
MUSCULOSKELETAL NEGATIVE: 1
PALPITATIONS: 0
CARDIOVASCULAR NEGATIVE: 1
NAUSEA: 1
BRUISES/BLEEDS EASILY: 0
SHORTNESS OF BREATH: 1
CONSTIPATION: 1

## 2025-03-02 ASSESSMENT — PAIN DESCRIPTION - PAIN TYPE
TYPE: ACUTE PAIN

## 2025-03-02 ASSESSMENT — FIBROSIS 4 INDEX: FIB4 SCORE: 3.96

## 2025-03-02 NOTE — PROGRESS NOTES
Rapid called for hypoxia, increased work of breathing.  Upon arrival, patient was requiring up to 10L saturating in the low 90s.  He was hypertensive with SBP in the 180s to 190s and tachycardic in the 140s.  Patient appeared discomfortable and struggling to catch his breath and his stomach was distended but without much pain.  Labs, EKG, chest x-ray, repeat CT scan ordered.    Hospitalist attending and ICU attending informed. After evaluation, recommending IMCU transfer for closer monitoring.    Possibly pulmonary edema/ARDS? CXR reassuring. Necrotizing pancreatitis with ascites? Repeat labs and scan pending. Holding IV fluids for now. Escalated MRCP read to radiology.

## 2025-03-02 NOTE — PROGRESS NOTES
"  DATE: 3/2/2025      Hospital Day 4 gallstone pancreatitis     INTERVAL EVENTS:  Overnight events noted  Transferred to higher level of care   Echo pending  CBC/CMP pending  Abdominal exam improved    PHYSICAL EXAMINATION:  Vital Signs: /72   Pulse 65   Temp 36.7 °C (98 °F) (Temporal)   Resp (!) 21   Ht 1.6 m (5' 2.99\")   Wt 81.2 kg (179 lb 0.2 oz)   SpO2 93%     A&O x 4  Respiratory rate even and unlabored  Abd soft / rotund   General tenderness, slightly worse RUQ - no peritoneal signs       ASSESSMENT AND PLAN:  * Gallstone pancreatitis- (present on admission)  Assessment & Plan  Pancreatitis on admission: Remains tender.  From a biliary standpoint, would recommend cholecystectomy during this hospitalization.  However, some concern for significant perioperative cardiac risk.  Will obtain echo and cardiac consult.  Once this is complete, we will have risk-benefit discussion with the patient      Coronary artery disease involving native coronary artery of native heart without angina pectoris- (present on admission)  Assessment & Plan  History of severe coronary artery disease.  Patient states he has 16 stents.  Last echo in 2023 showed LVEF 45%.  Repeat echo and obtain cardiology consult for perioperative risk assessment and identification of possible modifiable risk factors prior to surgery    Patient stresses that he would like to have a cholecystectomy during this hospitalization in order to prevent recurrent pancreatitis in the future.  While I think this is a possibility, strongly suggest a full cardiac workup given his significant cardiac history and limited functional status.  Echo is still pending. Once results are available please obtain a formal cardiology consult for perioperative risk assessment and identification of modifiable risk factors     Plan same admission lap damien       ____________________________________     NARAYAN Hickman.    DD: 3/2/2025  8:59 AM    "

## 2025-03-02 NOTE — ASSESSMENT & PLAN NOTE
3/4:  Hx of 16 stents and TAVR  Cardiology evaluation for preoperative risk assessment and optimization  Echocardiogram is unchanged, the patient verbalized a moderate  intervention risk  Optimize medical management. Benazepril, ezetimibe, metoprolol, rosuvastatin

## 2025-03-02 NOTE — PROGRESS NOTES
[facility] Medicine Daily Progress Note    Date of Service  3/2/2025    Chief Complaint  Enrico Cohen is a 84 y.o. male admitted 2/27/2025 with concern for gallstone pancreatitis    Hospital Course  Patient is an 84-year-old male with a past medical history of hypothyroidism, HTN, HLD, CAD status post 16 stents, PSA, GERD, aortic stenosis status post TAVR, CKD 3A, BPH that presented with epigastric pain with nausea and vomiting. He went golfing during the day, then came home with worsening pain. On arrival to ED he had one episode of vomiting. No history of similar pain/symptoms.  The patient was admitted to the medical team, was seen by the surgical team as well as gastroenterology in light of the patient's concern for gallbladder disease and possible obstructing stone leading to pancreatitis, the patient on the medical unit was given IV fluids, he developed respiratory difficulty on 3/1, was therefore upgraded to [facility] level of care.  The patient had adequate imaging and also including a CTA of the chest which was negative for pulm embolism the patient identified to have paroxysmal atrial fibrillation with intermittent RVR with heart rates in the 130s  The patient had fluids stopped, was given intravenous Lasix, was continued on digoxin for atrial fibrillation rate control, transferred to the [facility] level of care.  Surgery is intending to remove gallbladder secondary to the suspected cholecystitis once the patient is otherwise medically optimized    Interval Problem Update  Patient seen and examined today.  Data, Medication data reviewed.  Case discussed with nursing as available.  Plan of Care reviewed with patient and notified of changes.  3/2 the patient feels overall better, his abdomen is still somewhat firm, tender, the patient later in the morning had a bowel movement, hemodynamically improved, afebrile, heart rate in the 70s, atrial fibrillation, respiration unlabored, the patient is saturating in the  low 90s on 2 L nasal cannula oxygen, blood pressure 130s over 60s,  WBC 9.8 hemoglobin 11 platelet count 185 sodium 135 potassium 3.9 chloride 103 bicarb 21 glucose 110  Cardiology consulted for evaluation in terms of consideration of high surgical risk.  Awaiting follow-up echocardiogram  Reported as urgent since we need that vital piece of information for decision making in terms of operative clearance    I have discussed this patient's plan of care and discharge plan at IDT rounds today with Case Management, Nursing, Nursing leadership, and other members of the IDT team.    Consultants/Specialty  cardiology, critical care, general surgery, and GI    Code Status  Full Code    Disposition  The patient is not medically cleared for discharge to home or a post-acute facility.  Anticipate discharge to: home with close outpatient follow-up    I have placed the appropriate orders for post-discharge needs.    Review of Systems  Review of Systems   Constitutional:  Positive for malaise/fatigue. Negative for chills and fever.   HENT: Negative.     Eyes: Negative.    Respiratory:  Positive for shortness of breath.    Cardiovascular: Negative.    Gastrointestinal:  Positive for abdominal pain, constipation and nausea. Negative for vomiting.   Genitourinary: Negative.    Musculoskeletal: Negative.    Skin: Negative.    Neurological: Negative.    Endo/Heme/Allergies: Negative.    Psychiatric/Behavioral:  The patient is nervous/anxious.    All other systems reviewed and are negative.       Physical Exam  Temp:  [36 °C (96.8 °F)-36.7 °C (98 °F)] 36.2 °C (97.1 °F)  Pulse:  [] 75  Resp:  [16-28] 22  BP: (101-192)/() 147/68  SpO2:  [90 %-95 %] 92 %    Physical Exam  Vitals and nursing note reviewed.   Constitutional:       Appearance: He is well-developed. He is ill-appearing.      Comments: Pt seen and examined.   HENT:      Head: Normocephalic and atraumatic.   Eyes:      Pupils: Pupils are equal, round, and reactive  to light.   Cardiovascular:      Rate and Rhythm: Normal rate. Rhythm irregular.      Heart sounds: Murmur heard.   Pulmonary:      Effort: Pulmonary effort is normal.      Breath sounds: Rhonchi present.   Abdominal:      General: Bowel sounds are normal. There is distension.      Palpations: Abdomen is soft. There is no mass.      Tenderness: There is abdominal tenderness. There is no guarding.   Musculoskeletal:         General: Normal range of motion.      Cervical back: Normal range of motion and neck supple.   Skin:     General: Skin is warm and dry.   Neurological:      General: No focal deficit present.      Mental Status: He is alert and oriented to person, place, and time.      Motor: Weakness present.   Psychiatric:         Behavior: Behavior normal.         Fluids    Intake/Output Summary (Last 24 hours) at 3/2/2025 0812  Last data filed at 3/2/2025 0400  Gross per 24 hour   Intake 25 ml   Output 1375 ml   Net -1350 ml        Laboratory  Recent Labs     02/27/25 2025 02/28/25  0139 03/01/25  0726   WBC 20.1* 14.5* 12.9*   RBC 4.96 4.46* 4.11*   HEMOGLOBIN 14.3 12.7* 12.2*   HEMATOCRIT 43.0 39.1* 38.0*   MCV 86.7 87.7 92.5   MCH 28.8 28.5 29.7   MCHC 33.3 32.5 32.1*   RDW 42.6 43.0 47.9   PLATELETCT 251 229 203   MPV 11.2 11.2 12.4     Recent Labs     02/28/25  0139 03/01/25  0726 03/01/25  1540   SODIUM 139 135 134*   POTASSIUM 5.4 4.3 4.1   CHLORIDE 106 104 102   CO2 24 23 15*   GLUCOSE 126* 100* 129*   BUN 18 16 15   CREATININE 1.40 1.03 1.12   CALCIUM 8.6 8.5 9.0     Recent Labs     02/27/25 2025   INR 1.06         Recent Labs     02/27/25 2025   TRIGLYCERIDE 70   HDL 57   *       Imaging  EC-ECHOCARDIOGRAM COMPLETE W/ CONT         CT-CHEST,ABDOMEN WITH   Final Result      1.  Cholelithiasis and mildly distended gallbladder. If there is concern for acute cholecystitis, nuclear medicine HIDA scan may be performed.   2.  Nonspecific mild biliary dilatation. No definite evidence of  choledocholithiasis however correlate with biliary labs. If concern for obstruction, MRCP may be performed.   3.  Chest findings are described on concurrent CT angiogram chest report.      CT-CTA CHEST PULMONARY ARTERY W/ RECONS   Final Result         1.  No evidence of pulmonary embolism.   2.  Probable mild interstitial edema in the lung bases.   3.  Small bilateral pleural effusions with bibasilar atelectasis.   4.  A nonspecific probably cystic anterior mediastinal lesion, has a nonaggressive appearance however comparison with old outside prior exams would be helpful to evaluate for stability.      Fleischner Society pulmonary nodule recommendations:         DX-CHEST-PORTABLE (1 VIEW)   Final Result         Patchy left basilar opacities, atelectasis or infection.      [id number]-P/O   Final Result         1. No choledocholithiasis. Mildly dilated CBD.   2. Cholelithiasis. Mild stranding and fluid surrounding the gallbladder, concerning for cholecystitis.           Assessment/Plan  * Gallstone pancreatitis- (present on admission)  Assessment & Plan  Elevated lipase  No significant evidence of radiographic pancreatitis as per MRCP  MRCP negative for obstructing lesion  Positive cholecystitis likely with need for cholecystectomy  Monitor closely  Avoid volume overload  Medical treatment otherwise for now      Paroxysmal atrial fibrillation (HCC)- (present on admission)  Assessment & Plan  Not in RVR currently  - Continue home digoxin  - Hold home rivaroxaban presurgically    Benign prostatic hyperplasia- (present on admission)  Assessment & Plan  - Continue home tamsulosin    Stage 3a chronic kidney disease (CKD)- (present on admission)  Assessment & Plan  Baseline 1.3.  - Avoid nephrotoxic medications  - Renally dose medications as appropriate    Coronary artery disease involving native coronary artery of native heart without angina pectoris- (present on admission)  Assessment & Plan  Status post 16 stents  - Hold  home Plavix and rivaroxaban pending likely procedure  Reevaluate by echocardiography  Cardiology eval for preoperative evaluation    Obstructive sleep apnea- (present on admission)  Assessment & Plan  - CPAP    History of transcatheter aortic valve replacement (TAVR)- (present on admission)  Assessment & Plan  Status post TAVR.    GERD (gastroesophageal reflux disease)- (present on admission)  Assessment & Plan  -Continue home omeprazole    Cardiovascular disease- (present on admission)  Assessment & Plan  Cardiology evaluation for preoperative risk assessment and optimization    Mixed hyperlipidemia- (present on admission)  Assessment & Plan    - Continue home Rosuvastatin    Essential hypertension- (present on admission)  Assessment & Plan  - Continue home amlodipine-benazepril  - As needed labetalol    Plan  3/2/2025  Continue gentle pain control,  GI remedies for bowel management and pain control  Continue rate control for chronic atrial fibrillation  Wean oxygen further  Lasix as needed for control  Ongoing PPI  Empiric antibiotics  Cardiology eval for preoperative risk assessment and optimization  Awaiting echocardiogram  See orders  Patient is has a high medical complexity, complex decision making and is at high risk for complication, morbidity, and mortality.  I spent 58 minutes, reviewing the chart, obtaining and/or reviewing separately obtained history. Performing a medically appropriate examination and evaluation.  Counseling and educating the patient. Ordering and reviewing medications, tests, or procedures.   Documenting clinical information in EPIC. Independently interpreting results and communicating results to patient. Discussing future disposition of care with patient, RN and case management.    VTE prophylaxis:    heparin ppx      I have performed a physical exam and reviewed and updated ROS and Plan today (3/2/2025). In review of yesterday's note (3/1/2025), there are no changes except as  documented above.      Please note that this dictation was created using voice recognition software. I have made every reasonable attempt to correct obvious errors, but I expect that there are errors of grammar and possibly context that I did not discover before finalizing the note.

## 2025-03-02 NOTE — PROGRESS NOTES
[facility] Acceptance Note     Brief H&P   Rapid response called for increased work of breathing and hypoxemia.  Patient was requiring up to 10 L oxy mask.  He was nauseous and vomited with improvement in his symptoms.  He denied any abdominal pain, even with palpation.  He has been admitted for gallstone pancreatitis, initial lipase was 250 and it increased to greater than 3000.  He had an MRCP done, but it has not been read yet.  General surgery has been consulted and is considering a cholecystectomy prior to discharge, however, patient is high risk due to coronary artery disease, stage III CKD, hypertension, hyperlipidemia.  He also has a history of paroxysmal A-fib.  His home Xarelto has been held due to possible procedure.    Recommendations  -Will transfer to IM for closer monitoring for respiratory status  -Stop IV fluids  -Follow-up CT angio of the chest and abdomen  -Discussed with GI and they are reviewing the case to consider for ERCP, will make patient n.p.o. at midnight  -May need to consider a heparin drip for A-fib as we are pending procedures  -I-S and pain control  -Follow-up repeat echo    This patient will be admitted to the IMCU for hypoxia and gallstone pancreatitis and remains under the care of the hospitalist (who all questions and concerns should be relayed to).  While a critical care consultation has not been requested, we are immediately available if the patient requires critical care in the future.

## 2025-03-02 NOTE — PROGRESS NOTES
Monitor room called stating patient converted to Afib at 1856. Patient has a history of P-Afib, home med Xarelto held for possible procedure. Patient is NPO, sips with meds okay. Paged MD Moreno regarding updates. New orders in place for IV Lasix 40mg once, PO metoprolol 25mg BID, lab order for lactic acid, Rocephin 2,000mg IV push and Flagyl IVPB 500mg.     Educated patient regarding rhythm change and medications, patient verbalized understanding.

## 2025-03-02 NOTE — CONSULTS
CARDIOLOGY CONSULTATION NOTE      Reason of Consult: Preoperative assessment prior to cholecystectomy in a patient with known CAD, ischemic cardiomyopathy with EF of 45 to 50%, and history of aortic stenosis status post TAVR    Consulting Physician: Ayden Price MD    Outpatient cardiologist: Zechariah Terrell MD -patient mostly sees Darline Lee PA-C    HPI:  Enrico Cohen is a 84 y.o. male with coronary artery disease with history of prior PCI's of the RCA most recent was July 2023, severe aortic stenosis status post TAVR November 2022, PAF maintained on Xarelto, ischemic cardiomyopathy with a EF of 45 to 50%, hypertension, dyslipidemia, PAD with history of PTCA of the proximal right SFA August 2022, remote CVA, hypothyroidism, stage III chronic kidney disease, hypertension, and dyslipidemia who is admitted February 27 for gallstone pancreatitis.  He has been evaluated by general surgery and they are planning on cholecystectomy as long as his cardiac status is stable.    Patient denies any chest pain or pressure.  He reports that he continues to golf 2 or 3 times a week.  He is limited by some dyspnea on exertion which he has had for over a year.  He denies shortness of breath at rest.  He has dyspnea on exertion with walking approximately 1 block.  He denies any orthopnea or PND.  He reports chronic mild lower extremity edema for many years.    He reports that he has not history of dizziness for over a year and had 2 near syncopal episodes.  He has seen his outpatient cardiologist numerous times and she has adjusted his medications.  He has not had any further dizziness in several months.    Cath 7/26/2023 at [facility]  s/p coronary artery angiography via right radial arterial approach  Right dominant coronary anatomy.    Severe ISR of the mid/proximal RCA.  s/p Shockwave lithotripsy of the RCA  and subsequent stenting of the RCA.  Patent LAD stent.  Occluded first diagonal stent unchanged  from previous.  Recommend aggressive goal directed medical therapy and risk factor  modification.  We will change DAPT from Plavix to Brillinta.  Coronary Findings Diagnostic Dominance: Right  Left Anterior Descending: Ost LAD to Prox LAD lesion is 20% stenosed. The lesion was previously treated using a stent of unknown type. Mid LAD lesion is 50% stenosed. The lesion was previously treated using a stent of unknown type. First Diagonal Branch: 1st Diag lesion is 100% stenosed. The lesion was previously treated using a stent of unknown type.  Left Circumflex: Ost Cx to Prox Cx lesion is 40% stenosed. First Obtuse Marginal Branch: 1st Mrg-1 lesion is 70% stenosed. 1st Mrg-2 lesion is 70% stenosed.  Right Coronary Artery: Previously placed Ost RCA to Prox RCA stent (unknown type) is widely patent. Prox RCA to Mid RCA lesion is 95% stenosed. Culprit lesion. The lesion is type C. The lesion was previously treated using a drug-eluting stent, a stent of unknown type and atherectomy. Stent delivery was done by way of balloon expansion. Atherectomy was performed using a laser device. Mid RCA to Dist RCA lesion is 10% stenosed. The lesion was previously treated using a stent of unknown type. Right Posterior Descending Artery: RPDA lesion is 20% stenosed. The lesion was previously treated using a stent of unknown type. Right Posterior Atrioventricular Artery: RPAV lesion is 65% stenosed. The lesion was previously treated using a stent of unknown type and angioplasty. Angioplasty was performed using a standard balloon.  Intervention  Prox RCA to Mid RCA lesion: Post-Intervention Lesion Assessment: The intervention was successful. Post-intervention JOLENE flow is 3. There were no complications. There is a 0% residual stenosis post intervention.    Echo: 06/29/23 - Mild global hypokinesis of the LV. LVSF mildly reduced. EF 45-50%. TAVR with mild to mod perivalvular leak.       Past Medical History:   Diagnosis Date    Anticoagulant  "long-term use     on Plavix for stents    Aortic stenosis, severe     ASCVD (arteriosclerotic cardiovascular disease)     pt has had placement of 8 cardiac stents    BPH (benign prostatic hyperplasia)     CAD (coronary artery disease)     [place]    Elevated PSA     [place]    Esophageal reflux     Gout flare     painful feet.    History of transcatheter aortic valve replacement (TAVR)     Hyperlipidemia     Hypertension     on Lisinopril  and atenolol    Hypothyroid     PVD (peripheral vascular disease) ([facility])     Sleep apnea     supposed to use CPap but states\" can't tolerate it\"       Social History     Socioeconomic History    Marital status:      Spouse name: Christine    Number of children: 5    Years of education: Not on file    Highest education level: Not on file   Occupational History    Not on file   Tobacco Use    Smoking status: Former     Current packs/day: 0.00     Average packs/day: 0.5 packs/day for 50.0 years (25.0 ttl pk-yrs)     Types: Cigarettes     Start date: 1953     Quit date: 2003     Years since quittin.5    Smokeless tobacco: Never   Vaping Use    Vaping status: Never Used   Substance and Sexual Activity    Alcohol use: Not Currently     Alcohol/week: 0.0 oz    Drug use: No    Sexual activity: Not on file   Other Topics Concern    Not on file   Social History Narrative    Not on file     Social Drivers of Health     Financial Resource Strain: Not on file   Food Insecurity: No Food Insecurity (2025)    Hunger Vital Sign     Worried About Running Out of Food in the Last Year: Never true     Ran Out of Food in the Last Year: Never true   Transportation Needs: No Transportation Needs (2025)    PRAPARE - Transportation     Lack of Transportation (Medical): No     Lack of Transportation (Non-Medical): No   Physical Activity: Inactive (10/21/2021)    Exercise Vital Sign     Days of Exercise per Week: 0 days     Minutes of Exercise per Session: 0 min   Stress: Not on file "   Social Connections: Not on file   Intimate Partner Violence: Not At Risk (2/27/2025)    Humiliation, Afraid, Rape, and Kick questionnaire     Fear of Current or Ex-Partner: No     Emotionally Abused: No     Physically Abused: No     Sexually Abused: No   Housing Stability: Low Risk  (2/27/2025)    Housing Stability Vital Sign     Unable to Pay for Housing in the Last Year: No     Number of Times Moved in the Last Year: 0     Homeless in the Last Year: No       Current Facility-Administered Medications   Medication Dose Frequency Provider Last Rate Last Admin    bisacodyl (Dulcolax) suppository 10 mg  10 mg DAILY Ayden Price M.D.        ipratropium-albuterol (DUONEB) nebulizer solution  3 mL Q4H PRN (RT) Roge Sol M.D.        metoprolol tartrate (Lopressor) tablet 25 mg  25 mg BID Dylan Moreno M.D.   25 mg at 03/02/25 0630    Metoprolol Tartrate (Lopressor) injection 5 mg  5 mg Q HOUR PRN Dylan Moreno M.D.        cefTRIAXone (Rocephin) syringe 2,000 mg  2,000 mg Q24HRS Dylan Moreno M.D.   2,000 mg at 03/02/25 0750    metroNIDAZOLE (Flagyl) IVPB 500 mg  500 mg Q12HRS Dylan Moreno M.D.   Stopped at 03/02/25 0740    amLODIPine (Norvasc) tablet 5 mg  5 mg Q DAY Oswaldo Chawla M.D.   5 mg at 03/02/25 0629    And    benazepril (Lotensin) tablet 20 mg  20 mg Q DAY Oswaldo Chawla M.D.   20 mg at 03/02/25 0628    heparin injection 5,000 Units  5,000 Units Q8HRS Oswaldo Chawla M.D.   5,000 Units at 03/02/25 0628    senna-docusate (Pericolace Or Senokot S) 8.6-50 MG per tablet 2 Tablet  2 Tablet Q EVENING Oswaldo Chawla M.D.   2 Tablet at 03/01/25 1741    And    polyethylene glycol/lytes (Miralax) Packet 1 Packet  1 Packet QDAY PRN Oswaldo Chawla M.D.   1 Packet at 03/02/25 0750    labetalol (Normodyne/Trandate) injection 10 mg  10 mg Q4HRS PRN Oswaldo Chawla M.D.   10 mg at 03/01/25 1541    ondansetron (Zofran) syringe/vial injection 4 mg  4 mg Q4HRS  PRN Oswaldo Chawla M.D.   4 mg at 03/01/25 1551    Or    ondansetron (Zofran ODT) dispertab 4 mg  4 mg Q4HRS PRN Oswaldo Chawla M.D.   4 mg at 03/02/25 0817    acetaminophen (Tylenol) tablet 1,000 mg  1,000 mg Q6HRS Oswaldo Chawla M.D.   1,000 mg at 03/02/25 0629    Followed by    [START ON 3/4/2025] acetaminophen (Tylenol) tablet 1,000 mg  1,000 mg Q6HRS PRN Oswaldo Chawla M.D.        oxyCODONE immediate-release (Roxicodone) tablet 2.5 mg  2.5 mg Q4HRS PRN Oswaldo Chawla M.D.        Or    oxyCODONE immediate-release (Roxicodone) tablet 5 mg  5 mg Q4HRS PRN Oswaldo Chawla M.D.   5 mg at 03/01/25 2024    Or    HYDROmorphone (Dilaudid) injection 0.25 mg  0.25 mg Q4HRS PRN Oswaldo Chawla M.D.   0.25 mg at 03/01/25 1539    digoxin (Lanoxin) tablet 62.5 mcg  62.5 mcg DAILY Oswaldo Chawla M.D.   62.5 mcg at 03/02/25 0628    ezetimibe (Zetia) tablet 10 mg  10 mg DAILY Oswaldo Chawla M.D.   10 mg at 03/02/25 0629    finasteride (Proscar) tablet 5 mg  5 mg DAILY Oswaldo Chawla M.D.   5 mg at 03/02/25 0629    levothyroxine (Synthroid) tablet 100 mcg  100 mcg DAILY Oswaldo Chawla M.D.   100 mcg at 03/02/25 0629    nitroglycerin (Nitrostat) tablet 0.4 mg  0.4 mg 1X PRN Oswaldo Chawla M.D.        omeprazole (PriLOSEC) capsule 40 mg  40 mg DAILY Oswaldo Chawla M.D.   40 mg at 03/02/25 0628    rosuvastatin (Crestor) tablet 40 mg  40 mg DAILY JARED SingerDBlanca   40 mg at 03/02/25 0629    tamsulosin (Flomax) capsule 0.4 mg  0.4 mg DAILY JARED SingerDBlanca   0.4 mg at 03/02/25 0629   Last reviewed on 2/28/2025  1:04 PM by Merced Garcia [facility]     Patient has no known allergies.    No family history on file.    ROS:   Review of Systems   Constitutional:  Negative for chills and fever.   HENT:  Positive for hearing loss. Negative for congestion.    Respiratory:  Positive for shortness of breath. Negative for cough.    Cardiovascular:  Positive  "for leg swelling. Negative for chest pain, palpitations, orthopnea and PND.   Gastrointestinal:  Positive for abdominal pain and nausea.   Musculoskeletal:  Positive for myalgias.   Skin:  Negative for rash.   Neurological:  Positive for dizziness. Negative for loss of consciousness.   Endo/Heme/Allergies:  Does not bruise/bleed easily.   Psychiatric/Behavioral:  Negative for depression. The patient is not nervous/anxious.        Physical Exam     Blood pressure 130/62, pulse 70, temperature 36.7 °C (98 °F), temperature source Temporal, resp. rate (!) 21, height 1.6 m (5' 2.99\"), weight 81.2 kg (179 lb 0.2 oz), SpO2 91%.    Constitutional: Appears well-developed.   HENT: Normocephalic and atraumatic. No scleral icterus.   Neck: No JVD present.   Cardiovascular: Normal rate and rhythm. Exam reveals no gallop and no friction rub. No murmur heard.   Pulmonary/Chest: CTAB no rales, rhonchi, or wheezes  Abdominal: S/NT/ND BS+   Musculoskeletal:  Pulses present. No atrophy. Strength normal.  Extremities: Exhibits no edema. No clubbing or cyanosis.   Skin: Skin is warm and dry.   Neuro: Non-focal, CN 2-12 intact grossly      Intake/Output Summary (Last 24 hours) at 3/2/2025 1106  Last data filed at 3/2/2025 0900  Gross per 24 hour   Intake 0 ml   Output 1575 ml   Net -1575 ml         EKG (3/1/2025):  I have personally reviewed the EKG this visit and discussed with the patient. It shows sinus tachycardia but significant motion artifact with PACs.  Incomplete left bundle branch block.  Nonspecific ST-T wave abnormalities.    Recent Labs     02/28/25  0139 03/01/25  0726 03/02/25  0907   WBC 14.5* 12.9* 9.8   RBC 4.46* 4.11* 4.03*   HEMOGLOBIN 12.7* 12.2* 11.5*   HEMATOCRIT 39.1* 38.0* 35.3*   MCV 87.7 92.5 87.6   MCH 28.5 29.7 28.5   MCHC 32.5 32.1* 32.6   RDW 43.0 47.9 45.8   PLATELETCT 229 203 185   MPV 11.2 12.4 12.2     Recent Labs     03/01/25  0726 03/01/25  1540 03/02/25  0907   SODIUM 135 134* 135   POTASSIUM 4.3 4.1 " 3.9   CHLORIDE 104 102 103   CO2 23 15* 21   GLUCOSE 100* 129* 110*   BUN 16 15 18   CREATININE 1.03 1.12 1.14   CALCIUM 8.5 9.0 7.6*     Recent Labs     02/27/25 2025   INR 1.06         Recent Labs     02/27/25  1425 02/27/25  1654   TROPONINI 67.0* 48.1     Recent Labs     02/27/25 2025   TRIGLYCERIDE 70   HDL 57   *         Imaging reviewed    Impressions:  1.  Preoperative assessment prior to cholecystectomy  2.  Known CAD with prior PCI's of the RCA  3.  History of severe aortic stenosis status post TAVR 11/2022  4.  Ischemic cardiomyopathy with EF of 45 to 50%  5.  Gallstone pancreatitis  6.  Paroxysmal atrial fibrillation maintained on Xarelto  7.  Hypertension  8.  Dyslipidemia    Recommendations:  Echocardiogram has been ordered and is pending  As long as echocardiogram shows stable EF and no significant aortic stenosis, then patient is felt to be at low to intermediate risk for perioperative cardiac complications during cholecystectomy  Xarelto has been held for surgery  Continue other medications      We will continue to follow this patient with you. Please contact me with any questions.     Please note that this dictation was created using voice recognition software. There may be errors I did not discover before finalizing the note.     Betzaida Keith MD [facility]   Cardiologist  [facility]

## 2025-03-02 NOTE — CONSULTS
"Date of Consultation:  3/2/2025    Patient: : Enrico Cohen  MRN: [id number]    Referring Physician:  Dylan Moreno M.D.Attending      GI:Parish Willett M.D.     Reason for Consultation: Gallstone pancreatitis.     History of Present Illness:   The patient is 84 years old gentleman with medical history of aortic stenosis, coronary artery disease, GERD, GI team is consulted at this time for gallstone pancreatitis.    Gallstone was seen by multiple CAT scan and also MRI from yesterday.  So far no evidence to show definite choledocholithiasis in the common bile duct.  Patient total bilirubin increased from normal to right now 2.4, with some slightly elevated liver enzyme and a beer enzyme as well.    GI team is consulted for ERCP.    We saw the patient at bedside.  Stable vitals overnight.  Will be thirsty but not hungry.  No evidence of confusion.  Mild tenderness on the left side abdomen, consistent with pancreatic tail area.  Denies any middle or right epigastric pain.  The patient reported good night sleep in the IMC.  No nausea vomiting.  If Rojo sign at the bedside.      Past Medical History:   Diagnosis Date    Anticoagulant long-term use     on Plavix for stents    Aortic stenosis, severe     ASCVD (arteriosclerotic cardiovascular disease)     pt has had placement of 8 cardiac stents    BPH (benign prostatic hyperplasia)     CAD (coronary artery disease)     Ho    Elevated PSA     Jagjit    Esophageal reflux     Gout flare     painful feet.    History of transcatheter aortic valve replacement (TAVR)     Hyperlipidemia     Hypertension     on Lisinopril  and atenolol    Hypothyroid     PVD (peripheral vascular disease) (HCC)     Sleep apnea     supposed to use CPap but states\" can't tolerate it\"         Past Surgical History:   Procedure Laterality Date    KNEE ARTHROSCOPY  06/15/2015    Procedure: KNEE ARTHROSCOPIC Meniscal Excision, Abrasion Chondroplasty, Synovectomy;  Surgeon: Yasmani Silverio III, " M.D.;  Location: SURGERY [facility];  Service:     APPENDECTOMY  2006    ruptured while in [place]    AORTIC VALVE REPLACEMENT N/A     trans    ARTHROPLASTY Right     BUNIONECTOMY Bilateral     FUSION, SPINE, LUMBAR, PLIF      INGUINAL HERNIA REPAIR Left     KNEE ARTHROTOMY Right     36 yrs ago    ZZZ CARDIAC CATH   to 2013    x7 with placement 7 stents  (3 were angioplasty)       No family history on file.    Social History     Socioeconomic History    Marital status:      Spouse name: Christine    Number of children: 5   Tobacco Use    Smoking status: Former     Current packs/day: 0.00     Average packs/day: 0.5 packs/day for 50.0 years (25.0 ttl pk-yrs)     Types: Cigarettes     Start date: 1953     Quit date: 2003     Years since quittin.5    Smokeless tobacco: Never   Vaping Use    Vaping status: Never Used   Substance and Sexual Activity    Alcohol use: Not Currently     Alcohol/week: 0.0 oz    Drug use: No     Social Drivers of Health     Food Insecurity: No Food Insecurity (2025)    Hunger Vital Sign     Worried About Running Out of Food in the Last Year: Never true     Ran Out of Food in the Last Year: Never true   Transportation Needs: No Transportation Needs (2025)    PRAPARE - Transportation     Lack of Transportation (Medical): No     Lack of Transportation (Non-Medical): No   Physical Activity: Inactive (10/21/2021)    Exercise Vital Sign     Days of Exercise per Week: 0 days     Minutes of Exercise per Session: 0 min   Intimate Partner Violence: Not At Risk (2025)    Humiliation, Afraid, Rape, and Kick questionnaire     Fear of Current or Ex-Partner: No     Emotionally Abused: No     Physically Abused: No     Sexually Abused: No   Housing Stability: Low Risk  (2025)    Housing Stability Vital Sign     Unable to Pay for Housing in the Last Year: No     Number of Times Moved in the Last Year: 0     Homeless in the Last Year: No           Physical  Exam:  Vitals:    03/02/25 0115 03/02/25 0200 03/02/25 0400 03/02/25 0600   BP: 133/63  (!) 143/65 (!) 147/68   Pulse: 70  68 75   Resp: 16  20 (!) 22   Temp: 36 °C (96.8 °F)  36.2 °C (97.1 °F)    TempSrc: Temporal  Temporal    SpO2: 94%  95% 92%   Weight:  81.2 kg (179 lb 0.2 oz)         Constitutional: No fevers.  Eyes: No symptoms reported.   Ears/Nose/Throat/Mouth: No choking reported.  Cardiovascular: No chest pain reported.   Respiratory: Denies shortness of breath.  Gastrointestinal/Hepatic: Per H/P.   Skin/Breast: No new rash reported.   Psychiatric: No complaints    HEENT: grossly normal.  Cardiovascular: Please refer to primary team's daily assessment.   Lungs: Please refer to primary team's daily assessment.   Abdomen: Borderline tenderness at her left side abdomen.  Skin: No erythema, No rash.  Lower limbs: normal, no pitting edema.   Neurologic: Alert & oriented x 3, Normal motor function, No focal deficits noted.  PSY: stable mood.         Labs:  Recent Labs     02/27/25 2025 02/28/25 0139 03/01/25  0726   WBC 20.1* 14.5* 12.9*   RBC 4.96 4.46* 4.11*   HEMOGLOBIN 14.3 12.7* 12.2*   HEMATOCRIT 43.0 39.1* 38.0*   MCV 86.7 87.7 92.5   MCH 28.8 28.5 29.7   MCHC 33.3 32.5 32.1*   RDW 42.6 43.0 47.9   PLATELETCT 251 229 203   MPV 11.2 11.2 12.4     Recent Labs     02/28/25 0139 03/01/25  0726 03/01/25  1540   SODIUM 139 135 134*   POTASSIUM 5.4 4.3 4.1   CHLORIDE 106 104 102   CO2 24 23 15*   GLUCOSE 126* 100* 129*   BUN 18 16 15     Recent Labs     02/27/25 2025   INR 1.06       Recent Labs     02/27/25 2025 02/28/25  0139 03/01/25  0726 03/01/25  1540   ASTSGOT 384* 257* 114* 125*   ALTSGPT 242* 216* 157* 179*   TBILIRUBIN 2.3* 1.8* 2.0* 2.4*   ALKPHOSPHAT 115* 95 107* 148*   GLOBULIN 3.0 2.5 2.7 3.8*   INR 1.06  --   --   --          Imaging:  [id number]  Narrative: 3/1/2025 12:24 AM    HISTORY/REASON FOR EXAM:  Concern for gallstone pancreatitis    TECHNIQUE/EXAM DESCRIPTION: Magnetic resonance  cholangiopancreatography.    Magnetic resonance cholangiopancreatography was performed on with axial, coronal, and sagittal thin and thick section heavily T2-weighted sequences.    3-D cholangiographic multiplanar maximum intensity projection (MIP) images were created on a workstation..    The study was performed on a [employer] 1.5 Sabrina MRI scanner.    COMPARISON: CT 3/1/2025.    FINDINGS:    Limited evaluation due to lack of IV contrast.    Bile Ducts: Dilated CBD, measuring up to 9 mm    Gallbladder: Cholelithiasis. Distended with mild wall thickening and surrounding stranding.    Within the limits of noncontrast technique:    Liver: The liver is normal in size and configuration. No focal hepatic lesion is seen.    Pancreas and Pancreatic Duct: Mildly prominent pancreatic duct measuring up to 3 mm..    Kidneys: Normal.    Adrenal Glands: Normal.    Spleen: Normal in size without focal lesion.    Ascites: Small amount of free fluid in the right lower quadrant.    Lymph Nodes: No abdominal lymphadenopathy is seen.    Visualized Bowel: Grossly normal.    Bones: No suspicious osseous lesions are noted.  Impression: 1. No choledocholithiasis. Mildly dilated CBD.  2. Cholelithiasis. Mild stranding and fluid surrounding the gallbladder, concerning for cholecystitis.  CT-CHEST,ABDOMEN WITH  Narrative: 3/1/2025 4:04 PM    HISTORY/REASON FOR EXAM:  rapid  Rapid response. Chest and abdominal pain    TECHNIQUE/EXAM DESCRIPTION: CT scan of the chest and abdomen with contrast.    Thin-section helical images were obtained from the lung apices through the iliac crests following the bolus administration of 100 mL of Omnipaque 350 nonionic contrast.    Low dose optimization technique was utilized for this CT exam including automated exposure control and adjustment of the mA and/or kV according to patient size.    COMPARISON:  CTA chest of the same day    FINDINGS:  CT Chest:  Lungs: Bilateral lower lobe atelectasis and some mild  interstitial edema in the lung bases..    Mediastinum/Sapna: Nonspecific anterior mediastinal probably cystic lesion as described on concurrent CTA chest. No definite adenopathy.    Pleura: Small bilateral pleural effusions.    Cardiac: Heart normal in size without pericardial effusion. There is coronary artery calcification.    Vascular: Atherosclerosis.    Soft tissues: Unremarkable.    Bones: No acute or destructive process.    CT Abdomen:  Liver: Normal.    Spleen: Unremarkable.    Pancreas: Unremarkable.    Gallbladder: Cholelithiasis.    Biliary: Nonspecific mild dilatation. Correlate with biliary labs. No definite evidence of choledocholithiasis. Findings could be seen with senescent changes.    Adrenal glands: Normal.    Kidneys: Unremarkable without hydronephrosis.    Bowel: No obstruction or acute inflammation.    Lymph nodes: No adenopathy.    Vasculature: Atherosclerosis.    Peritoneum: Unremarkable without ascites.    Musculoskeletal: No acute or destructive process. Lumbar spondylosis and laminectomies.  Impression: 1.  Cholelithiasis and mildly distended gallbladder. If there is concern for acute cholecystitis, nuclear medicine HIDA scan may be performed.  2.  Nonspecific mild biliary dilatation. No definite evidence of choledocholithiasis however correlate with biliary labs. If concern for obstruction, MRCP may be performed.  3.  Chest findings are described on concurrent CT angiogram chest report.  CT-CTA CHEST PULMONARY ARTERY W/ RECONS  Narrative: 3/1/2025 4:04 PM    HISTORY/REASON FOR EXAM:  RR pancreatitis  Chest and abdominal pain. Code cardiac arrest    TECHNIQUE/EXAM DESCRIPTION:  CT angiogram scan for pulmonary embolism with contrast, with reconstructions.    1.25 mm helical sections were obtained from the lung apices through the lung bases following the rapid bolus administration of 90 mL of Omnipaque 350 nonionic contrast. Thin-section overlapping reconstruction interval was utilized.  Coronal   reconstructions were generated from the axial data. MIP post processing was performed and utilized for the interpretation.    Low dose optimization technique was utilized for this CT exam including automated exposure control and adjustment of the mA and/or kV according to patient size.    COMPARISON: None    FINDINGS:  Neck Base:  Normal.    Lungs/Pleura: Mild interstitial lobular septal thickening in the lung bases suggesting mild interstitial edema. Lower lobe compressive atelectasis and some mild bandlike atelectasis in the posterior right upper lobe. Small bilateral pleural effusions.    Cardiovascular Structures: The pulmonary arteries are adequately opacified through the subsegmental levels. No intraluminal filling defect is identified to suggest pulmonary embolus. Central pulmonary arteries are normal in caliber. Heart size is normal.   No pericardial effusion. There is atherosclerosis of the coronary arteries and aorta. There is an aortic valve prosthesis. Aorta is normal in caliber without aneurysm.    Mediastinum/ lymph nodes: A nonspecific circumscribed anterior mediastinal low density lesion measuring 2.4 x 1.8 cm is probably cystic although mildly above fluid density. Comparison with old outside prior exams would be helpful to evaluate for   stability.    Upper Abdomen:  Described on separate report    Soft tissues/wall: Within normal limits.    Bones:  No acute or aggressive abnormality. Smooth flowing syndesmophytes/osteophytes, possibly ankylosing spondylitis.  Impression: 1.  No evidence of pulmonary embolism.  2.  Probable mild interstitial edema in the lung bases.  3.  Small bilateral pleural effusions with bibasilar atelectasis.  4.  A nonspecific probably cystic anterior mediastinal lesion, has a nonaggressive appearance however comparison with old outside prior exams would be helpful to evaluate for stability.    Fleischner Society pulmonary nodule recommendations:  DX-CHEST-PORTABLE  (1 VIEW)  Narrative: 3/1/2025 3:41 PM    HISTORY/REASON FOR EXAM:  Shortness of Breath    TECHNIQUE/EXAM DESCRIPTION AND NUMBER OF VIEWS:  Single portable view of the chest.    COMPARISON: None    FINDINGS:    Patchy left basilar opacity. No pleural effusion. No pneumothorax.    Normal cardiopericardial silhouette.  Impression: Patchy left basilar opacities, atelectasis or infection.            Impressions:  The patient is 84 years old gentleman with medical history of aortic stenosis, coronary artery disease, GERD, GI team is consulted at this time for gallstone pancreatitis/ERCP.    Gallstone was seen by multiple CAT scan and also MRI from yesterday.  So far no evidence to show definite choledocholithiasis in the common bile duct.  Patient total bilirubin increased from normal to right now 2.4, with some slightly elevated liver enzyme and biliary enzymes as well.      Overall looks like stone passed through related pancreatitis.  Based on current lab and CT and MRI, no plan to do ERCP today.  GI team will continue to follow-up to see the trend of his liver and the biliary enzymes.  If the patient feel ready to eat or drink, the patient should be encouraged to resume oral intake.    Inpatient GI team will continue to follow up.       This note was generated using voice recognition software which has a small chance of producing errors of grammar and possibly content. I have made every reasonable attempt to find and correct any obvious errors, but expect that some may not be found prior to finalization of this note.

## 2025-03-02 NOTE — PROGRESS NOTES
Rapid Response Summary     Rapid response called at 1523 for: Shortness of breath , Increased oxygen demand , and Tachycardia     VS: Tachycardia , Hypertensive , and Hypoxic  (See Vitals Flowsheet)  Additional info: Patient with increasing O2 demands and HTN. Increased abdominal pain, here for pancreatitis.   MD Paged: Dr. Reno  Interventions:    Imaging/Tests: Chest X-ray, CT, and EKG , PE Study, Bedside ECHO   Labs: CBC, CMP, Lactic Acid, Troponin, and Blood Glucose    Medications: Ativan, Dilauded, Labetolol   Other: N/A  Disposition: Improved with rapid response team interventions. Primary RN updated on plan of care. Patient transferred to [facility].

## 2025-03-02 NOTE — PROGRESS NOTES
Monitor Summary:    Afib, HR 60-80s, 5 bts of Vtach  Agree with interval measurements

## 2025-03-02 NOTE — PROGRESS NOTES
[facility] Hospitalist Progress Note    Date of Service: 3/1/2025    Chief Complaint  84-year-old male with a past medical history of hypothyroidism, HTN, HLD, CAD status post 16 stents, PSA, GERD, aortic stenosis status post TAVR, CKD 3A, BPH, who presented as a direct admit with gallstone pancreatitis    Interval Problem Update  Patient developed tachycardia, hypertension and respiratory failure with hypoxia.  O2 requirement 6 L.  He was accepted to IMCU for close monitoring.    CTA of the chest came back negative for PE    It appears that patient gained 4 kg since admission probably secondary to IV fluids.  EKG revealed a paroxysm of A-fib with RVR with heart rate 130    I evaluated the patient at bedside and review the chart.          ROS   Physical Exam  Laboratory/Imaging   Hemodynamics  Temp (24hrs), Av.5 °C (97.7 °F), Min:36.2 °C (97.2 °F), Max:36.7 °C (98 °F)   Temperature: 36.4 °C (97.6 °F)  Pulse  Av.1  Min: 53  Max: 131    Blood Pressure : (!) 148/67      Respiratory      Respiration: (!) 25, Pulse Oximetry: 94 %     Given By:: Mask, Work Of Breathing / Effort: Moderate;Tachypnea  RUL Breath Sounds: Expiratory Wheezes, RML Breath Sounds: Diminished, RLL Breath Sounds: Diminished, SASKIA Breath Sounds: Diminished, LLL Breath Sounds: Diminished    Fluids    Intake/Output Summary (Last 24 hours) at 3/1/2025 2316  Last data filed at 3/1/2025 2258  Gross per 24 hour   Intake 175 ml   Output 825 ml   Net -650 ml       Nutrition  Orders Placed This Encounter   Procedures    Diet NPO Restrict to: Sips with Medications     Standing Status:   Standing     Number of Occurrences:   1     Diet NPO Restrict to::   Sips with Medications [3]     Physical Exam  General: In mild respiratory distress  CV: Tachycardic, rhythm irregular  RS: Bibasilar Rales  GI: Abdomen soft, tender in epigastrium, right upper quadrant.  No rebound, no guarding  Neuro: Alert oriented x 3, no motor deficit.  MSK: No edema  Recent Labs      02/27/25 2025 02/28/25  0139 03/01/25  0726   WBC 20.1* 14.5* 12.9*   RBC 4.96 4.46* 4.11*   HEMOGLOBIN 14.3 12.7* 12.2*   HEMATOCRIT 43.0 39.1* 38.0*   MCV 86.7 87.7 92.5   MCH 28.8 28.5 29.7   MCHC 33.3 32.5 32.1*   RDW 42.6 43.0 47.9   PLATELETCT 251 229 203   MPV 11.2 11.2 12.4     Recent Labs     02/28/25  0139 03/01/25  0726 03/01/25  1540   SODIUM 139 135 134*   POTASSIUM 5.4 4.3 4.1   CHLORIDE 106 104 102   CO2 24 23 15*   GLUCOSE 126* 100* 129*   BUN 18 16 15   CREATININE 1.40 1.03 1.12   CALCIUM 8.6 8.5 9.0     Recent Labs     02/27/25 2025   INR 1.06         Recent Labs     02/27/25 2025   TRIGLYCERIDE 70   HDL 57   *          Assessment/Plan     Acute respiratory failure with hypoxia  Volume overload,   hypertensive urgency,   flash pulmonary edema  Paroxysmal A-fib with RVR  Patient developed respiratory distress and hypoxia in the setting of positive fluid balance, uncontrolled A-fib with RVR, and with underlying systolic heart failure with EF 45%  Plan: IV Lasix 40 mg once.  Hold IV fluids.  Blood pressure control with IV hydralazine as needed.  Restart p.o. metoprolol 40 mg twice daily.  Continue digoxin, check digoxin level  IV metoprolol as needed for heart rate above 130  RT protocol, continuous pulse oximetry, supplemental oxygen  Will continue holding anticoagulation for upcoming surgery.      * Gallstone pancreatitis- (present on admission)  Assessment & Plan  MRCP today showed no cholelithiasis.  Concern for cholecystitis  Will put on ceftriaxone/Flagyl      Benign prostatic hyperplasia- (present on admission)  Assessment & Plan  - Continue home tamsulosin    Stage 3a chronic kidney disease (CKD)- (present on admission)  Assessment & Plan  Baseline 1.3.  - Avoid nephrotoxic medications  - Renally dose medications as appropriate    Coronary artery disease involving native coronary artery of native heart without angina pectoris- (present on admission)  Assessment & Plan  Status post 16  stents  - Hold home Plavix and rivaroxaban pending likely procedure    Obstructive sleep apnea- (present on admission)  Assessment & Plan  - CPAP    History of transcatheter aortic valve replacement (TAVR)- (present on admission)  Assessment & Plan  Status post TAVR.    GERD (gastroesophageal reflux disease)- (present on admission)  Assessment & Plan  -Continue home omeprazole    Mixed hyperlipidemia- (present on admission)  Assessment & Plan    - Continue home Rosuvastatin    Essential hypertension- (present on admission)  Assessment & Plan  - Continue home amlodipine-benazepril  - As needed labetalol      Quality-Core Measures        Patient is critically ill.   The patient continues to have: A-fib with RVR, flash pulmonary edema  The vital organ system that is affected is the: Cardiovascular, respiratory  If untreated there is a high chance of deterioration into: Cardiogenic shock, respiratory failure, respiratory arrest  And eventually death.   The critical care that I am providing today is: Evaluation of the patient with rapid response team.  Treatment of above conditions with  IV Lasix, IV metoprolol, p.o. metoprolol  Coordination of care.    The critical that has been undertaken is medically complex.   There has been no overlap in critical care time.   Critical Care Time not including procedures: 46 min

## 2025-03-02 NOTE — CARE PLAN
The patient is Stable - Low risk of patient condition declining or worsening    Shift Goals  Clinical Goals: hemodynamic stability, wean o2, control nausea  Patient Goals: eat  Family Goals: SHANNON    Progress made toward(s) clinical / shift goals:    Problem: Pain - Standard  Goal: Alleviation of pain or a reduction in pain to the patient’s comfort goal  Outcome: Progressing     Problem: Hemodynamics  Goal: Patient's hemodynamics, fluid balance and neurologic status will be stable or improve  Outcome: Progressing     Problem: Respiratory  Goal: Patient will achieve/maintain optimum respiratory ventilation and gas exchange  Outcome: Progressing     Problem: Urinary Elimination  Goal: Establish and maintain regular urinary output  Outcome: Progressing

## 2025-03-02 NOTE — CARE PLAN
The patient is Watcher - Medium risk of patient condition declining or worsening    Shift Goals  Clinical Goals: VSS, pain management, monitor O2, wean O2, safety and fall prevention  Patient Goals: MRCP results, feel better  Family Goals: update on POC      Problem: Knowledge Deficit - Standard  Goal: Patient and family/care givers will demonstrate understanding of plan of care, disease process/condition, diagnostic tests and medications  Outcome: Progressing     Problem: Fall Risk  Goal: Patient will remain free from falls  Outcome: Progressing    Problem: Pain - Standard  Goal: Alleviation of pain or a reduction in pain to the patient’s comfort goal  Outcome: Progressing     Problem: Hemodynamics  Goal: Patient's hemodynamics, fluid balance and neurologic status will be stable or improve  Outcome: Progressing     Problem: Respiratory  Goal: Patient will achieve/maintain optimum respiratory ventilation and gas exchange  Outcome: Progressing     Problem: Urinary Elimination  Goal: Establish and maintain regular urinary output  Outcome: Progressing

## 2025-03-03 ENCOUNTER — ANESTHESIA EVENT (OUTPATIENT)
Dept: SURGERY | Facility: MEDICAL CENTER | Age: 85
DRG: 417 | End: 2025-03-03
Payer: COMMERCIAL

## 2025-03-03 LAB
ALBUMIN SERPL BCP-MCNC: 3.3 G/DL (ref 3.2–4.9)
ALBUMIN/GLOB SERPL: 1.2 G/DL
ALP SERPL-CCNC: 145 U/L (ref 30–99)
ALT SERPL-CCNC: 89 U/L (ref 2–50)
ANION GAP SERPL CALC-SCNC: 11 MMOL/L (ref 7–16)
AST SERPL-CCNC: 51 U/L (ref 12–45)
BILIRUB SERPL-MCNC: 0.7 MG/DL (ref 0.1–1.5)
BUN SERPL-MCNC: 19 MG/DL (ref 8–22)
CALCIUM ALBUM COR SERPL-MCNC: 8.7 MG/DL (ref 8.5–10.5)
CALCIUM SERPL-MCNC: 8.1 MG/DL (ref 8.5–10.5)
CHLORIDE SERPL-SCNC: 105 MMOL/L (ref 96–112)
CO2 SERPL-SCNC: 22 MMOL/L (ref 20–33)
CREAT SERPL-MCNC: 1.15 MG/DL (ref 0.5–1.4)
ERYTHROCYTE [DISTWIDTH] IN BLOOD BY AUTOMATED COUNT: 45.2 FL (ref 35.9–50)
GFR SERPLBLD CREATININE-BSD FMLA CKD-EPI: 62 ML/MIN/1.73 M 2
GLOBULIN SER CALC-MCNC: 2.8 G/DL (ref 1.9–3.5)
GLUCOSE SERPL-MCNC: 102 MG/DL (ref 65–99)
HCT VFR BLD AUTO: 37.4 % (ref 42–52)
HGB BLD-MCNC: 12.2 G/DL (ref 14–18)
MAGNESIUM SERPL-MCNC: 1.9 MG/DL (ref 1.5–2.5)
MCH RBC QN AUTO: 28.7 PG (ref 27–33)
MCHC RBC AUTO-ENTMCNC: 32.6 G/DL (ref 32.3–36.5)
MCV RBC AUTO: 88 FL (ref 81.4–97.8)
NT-PROBNP SERPL IA-MCNC: 5968 PG/ML (ref 0–125)
PHOSPHATE SERPL-MCNC: 2.1 MG/DL (ref 2.5–4.5)
PLATELET # BLD AUTO: 220 K/UL (ref 164–446)
PMV BLD AUTO: 11.8 FL (ref 9–12.9)
POTASSIUM SERPL-SCNC: 4.3 MMOL/L (ref 3.6–5.5)
PROT SERPL-MCNC: 6.1 G/DL (ref 6–8.2)
RBC # BLD AUTO: 4.25 M/UL (ref 4.7–6.1)
SODIUM SERPL-SCNC: 138 MMOL/L (ref 135–145)
WBC # BLD AUTO: 10.4 K/UL (ref 4.8–10.8)

## 2025-03-03 PROCEDURE — A9270 NON-COVERED ITEM OR SERVICE: HCPCS | Performed by: STUDENT IN AN ORGANIZED HEALTH CARE EDUCATION/TRAINING PROGRAM

## 2025-03-03 PROCEDURE — 99233 SBSQ HOSP IP/OBS HIGH 50: CPT | Performed by: HOSPITALIST

## 2025-03-03 PROCEDURE — 700102 HCHG RX REV CODE 250 W/ 637 OVERRIDE(OP): Performed by: HOSPITALIST

## 2025-03-03 PROCEDURE — 83880 ASSAY OF NATRIURETIC PEPTIDE: CPT

## 2025-03-03 PROCEDURE — 51798 US URINE CAPACITY MEASURE: CPT

## 2025-03-03 PROCEDURE — 85027 COMPLETE CBC AUTOMATED: CPT

## 2025-03-03 PROCEDURE — 770000 HCHG ROOM/CARE - INTERMEDIATE ICU *

## 2025-03-03 PROCEDURE — 99232 SBSQ HOSP IP/OBS MODERATE 35: CPT | Performed by: NURSE PRACTITIONER

## 2025-03-03 PROCEDURE — 83735 ASSAY OF MAGNESIUM: CPT

## 2025-03-03 PROCEDURE — 700105 HCHG RX REV CODE 258: Performed by: HOSPITALIST

## 2025-03-03 PROCEDURE — 700111 HCHG RX REV CODE 636 W/ 250 OVERRIDE (IP): Performed by: HOSPITALIST

## 2025-03-03 PROCEDURE — A9270 NON-COVERED ITEM OR SERVICE: HCPCS | Performed by: HOSPITALIST

## 2025-03-03 PROCEDURE — 700111 HCHG RX REV CODE 636 W/ 250 OVERRIDE (IP): Performed by: INTERNAL MEDICINE

## 2025-03-03 PROCEDURE — A9270 NON-COVERED ITEM OR SERVICE: HCPCS | Performed by: INTERNAL MEDICINE

## 2025-03-03 PROCEDURE — 84100 ASSAY OF PHOSPHORUS: CPT

## 2025-03-03 PROCEDURE — 700105 HCHG RX REV CODE 258: Performed by: INTERNAL MEDICINE

## 2025-03-03 PROCEDURE — 700102 HCHG RX REV CODE 250 W/ 637 OVERRIDE(OP): Performed by: INTERNAL MEDICINE

## 2025-03-03 PROCEDURE — 99232 SBSQ HOSP IP/OBS MODERATE 35: CPT | Performed by: STUDENT IN AN ORGANIZED HEALTH CARE EDUCATION/TRAINING PROGRAM

## 2025-03-03 PROCEDURE — 700101 HCHG RX REV CODE 250: Performed by: HOSPITALIST

## 2025-03-03 PROCEDURE — 700111 HCHG RX REV CODE 636 W/ 250 OVERRIDE (IP): Performed by: STUDENT IN AN ORGANIZED HEALTH CARE EDUCATION/TRAINING PROGRAM

## 2025-03-03 PROCEDURE — 700102 HCHG RX REV CODE 250 W/ 637 OVERRIDE(OP): Performed by: STUDENT IN AN ORGANIZED HEALTH CARE EDUCATION/TRAINING PROGRAM

## 2025-03-03 PROCEDURE — 80053 COMPREHEN METABOLIC PANEL: CPT

## 2025-03-03 RX ORDER — THIAMINE HYDROCHLORIDE 100 MG/ML
200 INJECTION, SOLUTION INTRAMUSCULAR; INTRAVENOUS 2 TIMES DAILY
Status: COMPLETED | OUTPATIENT
Start: 2025-03-03 | End: 2025-03-05

## 2025-03-03 RX ORDER — FUROSEMIDE 10 MG/ML
40 INJECTION INTRAMUSCULAR; INTRAVENOUS ONCE
Status: COMPLETED | OUTPATIENT
Start: 2025-03-03 | End: 2025-03-03

## 2025-03-03 RX ORDER — METRONIDAZOLE 500 MG/1
500 TABLET ORAL EVERY 12 HOURS
Status: DISCONTINUED | OUTPATIENT
Start: 2025-03-03 | End: 2025-03-06

## 2025-03-03 RX ORDER — MAGNESIUM SULFATE HEPTAHYDRATE 40 MG/ML
2 INJECTION, SOLUTION INTRAVENOUS ONCE
Status: COMPLETED | OUTPATIENT
Start: 2025-03-03 | End: 2025-03-03

## 2025-03-03 RX ORDER — ENOXAPARIN SODIUM 100 MG/ML
40 INJECTION SUBCUTANEOUS DAILY
Status: DISCONTINUED | OUTPATIENT
Start: 2025-03-03 | End: 2025-03-06

## 2025-03-03 RX ORDER — TAMSULOSIN HYDROCHLORIDE 0.4 MG/1
0.4 CAPSULE ORAL ONCE
Status: COMPLETED | OUTPATIENT
Start: 2025-03-03 | End: 2025-03-03

## 2025-03-03 RX ORDER — TAMSULOSIN HYDROCHLORIDE 0.4 MG/1
0.8 CAPSULE ORAL DAILY
Status: DISCONTINUED | OUTPATIENT
Start: 2025-03-04 | End: 2025-03-06 | Stop reason: HOSPADM

## 2025-03-03 RX ADMIN — LABETALOL HYDROCHLORIDE 10 MG: 5 INJECTION INTRAVENOUS at 04:17

## 2025-03-03 RX ADMIN — METRONIDAZOLE 500 MG: 500 TABLET ORAL at 18:23

## 2025-03-03 RX ADMIN — METOPROLOL TARTRATE 25 MG: 25 TABLET, FILM COATED ORAL at 18:24

## 2025-03-03 RX ADMIN — TAMSULOSIN HYDROCHLORIDE 0.4 MG: 0.4 CAPSULE ORAL at 16:21

## 2025-03-03 RX ADMIN — DIGOXIN 62.5 MCG: 0.12 TABLET ORAL at 05:40

## 2025-03-03 RX ADMIN — OXYCODONE HYDROCHLORIDE 5 MG: 5 TABLET ORAL at 05:51

## 2025-03-03 RX ADMIN — THIAMINE HYDROCHLORIDE 200 MG: 100 INJECTION, SOLUTION INTRAMUSCULAR; INTRAVENOUS at 12:41

## 2025-03-03 RX ADMIN — EZETIMIBE 10 MG: 10 TABLET ORAL at 05:39

## 2025-03-03 RX ADMIN — OMEPRAZOLE 40 MG: 20 CAPSULE, DELAYED RELEASE ORAL at 05:39

## 2025-03-03 RX ADMIN — ACETAMINOPHEN 1000 MG: 500 TABLET ORAL at 12:40

## 2025-03-03 RX ADMIN — THIAMINE HYDROCHLORIDE 200 MG: 100 INJECTION, SOLUTION INTRAMUSCULAR; INTRAVENOUS at 21:47

## 2025-03-03 RX ADMIN — CEFTRIAXONE SODIUM 2000 MG: 10 INJECTION, POWDER, FOR SOLUTION INTRAVENOUS at 05:44

## 2025-03-03 RX ADMIN — BENAZEPRIL HYDROCHLORIDE 20 MG: 20 TABLET, FILM COATED ORAL at 05:39

## 2025-03-03 RX ADMIN — LEVOTHYROXINE SODIUM 100 MCG: 0.1 TABLET ORAL at 05:39

## 2025-03-03 RX ADMIN — FINASTERIDE 5 MG: 5 TABLET, FILM COATED ORAL at 05:42

## 2025-03-03 RX ADMIN — TAMSULOSIN HYDROCHLORIDE 0.4 MG: 0.4 CAPSULE ORAL at 05:40

## 2025-03-03 RX ADMIN — ACETAMINOPHEN 1000 MG: 500 TABLET ORAL at 18:25

## 2025-03-03 RX ADMIN — FUROSEMIDE 40 MG: 10 INJECTION, SOLUTION INTRAVENOUS at 09:42

## 2025-03-03 RX ADMIN — ROSUVASTATIN CALCIUM 40 MG: 20 TABLET, FILM COATED ORAL at 05:40

## 2025-03-03 RX ADMIN — TAMSULOSIN HYDROCHLORIDE 0.4 MG: 0.4 CAPSULE ORAL at 12:42

## 2025-03-03 RX ADMIN — SODIUM PHOSPHATE, MONOBASIC, MONOHYDRATE AND SODIUM PHOSPHATE, DIBASIC, ANHYDROUS 15 MMOL: 276; 142 INJECTION, SOLUTION INTRAVENOUS at 16:24

## 2025-03-03 RX ADMIN — METRONIDAZOLE 500 MG: 5 INJECTION, SOLUTION INTRAVENOUS at 05:46

## 2025-03-03 RX ADMIN — ENOXAPARIN SODIUM 40 MG: 100 INJECTION SUBCUTANEOUS at 18:23

## 2025-03-03 RX ADMIN — MAGNESIUM SULFATE HEPTAHYDRATE 2 G: 2 INJECTION, SOLUTION INTRAVENOUS at 13:03

## 2025-03-03 RX ADMIN — ACETAMINOPHEN 1000 MG: 500 TABLET ORAL at 04:27

## 2025-03-03 RX ADMIN — AMLODIPINE BESYLATE 5 MG: 10 TABLET ORAL at 05:41

## 2025-03-03 RX ADMIN — HEPARIN SODIUM 5000 UNITS: 5000 INJECTION, SOLUTION INTRAVENOUS; SUBCUTANEOUS at 05:41

## 2025-03-03 RX ADMIN — METOPROLOL TARTRATE 25 MG: 25 TABLET, FILM COATED ORAL at 05:41

## 2025-03-03 ASSESSMENT — ENCOUNTER SYMPTOMS
FEVER: 0
SHORTNESS OF BREATH: 0
CONSTIPATION: 1
NEUROLOGICAL NEGATIVE: 1
SHORTNESS OF BREATH: 1
CONSTIPATION: 0
EYES NEGATIVE: 1
NERVOUS/ANXIOUS: 1
BLOOD IN STOOL: 0
MUSCULOSKELETAL NEGATIVE: 1
DIARRHEA: 0
CARDIOVASCULAR NEGATIVE: 1
HEARTBURN: 0
ABDOMINAL PAIN: 1
DEPRESSION: 0
NAUSEA: 0
VOMITING: 0
BACK PAIN: 0
BLURRED VISION: 0
CHILLS: 0
DIZZINESS: 0
NAUSEA: 1
WEAKNESS: 0
COUGH: 0

## 2025-03-03 ASSESSMENT — PAIN DESCRIPTION - PAIN TYPE
TYPE: ACUTE PAIN

## 2025-03-03 ASSESSMENT — FIBROSIS 4 INDEX: FIB4 SCORE: 3.01

## 2025-03-03 NOTE — PROGRESS NOTES
..Gastroenterology Progress Note               Author:  Ainsley Torres, RADHA,  APRN Date & Time Created: 3/3/2025 7:40 AM       Patient ID:  Name:             Enrico Cohen  YOB: 1940  Age:                 84 y.o.  male  MRN:               [id number]    Medical Decision Making, by Problem:  Active Hospital Problems    Diagnosis     Gallstone pancreatitis [K85.10]     Paroxysmal atrial fibrillation (HCC) [I48.0]     Benign prostatic hyperplasia [N40.0]     Stage 3a chronic kidney disease (CKD) [N18.31]     Cardiovascular disease [I25.10]     Obstructive sleep apnea [G47.33]     Coronary artery disease involving native coronary artery of native heart without angina pectoris [I25.10]     GERD (gastroesophageal reflux disease) [K21.9]     Essential hypertension [I10]     History of transcatheter aortic valve replacement (TAVR) [Z95.2]     Mixed hyperlipidemia [E78.2]      Presenting Chief Complaint:  Gallstone pancreatitis    History of present illness:  This is an 84-year-old male with history of CAD status post 16 stents, aortic stenosis status post TAVR, HFrEF, CKD stage IIIa, hypertension, hypothyroidism, hyperlipidemia that presented with epigastric pain and vomiting.  He was found to have gallstone pancreatitis secondary to cholelithiasis.  Gastroenterology was consulted for consideration of ERCP.    No choledocholithiasis identified on MRCP.  Patient's bilirubin has normalized and liver enzymes continue to downtrend.  Surgery has consulted and is planning on cholecystectomy when medically optimized.    Interval History:  3/3/2025: Patient seen.  Only complaint is mild abdominal tenderness and urinary frequency.  Has not had a bowel movement.  Labs reviewed, reports he is going for cholecystectomy tomorrow.    Hospital Medications:  Current Facility-Administered Medications   Medication Dose Frequency Provider Last Rate Last Admin    bisacodyl (Dulcolax) suppository 10 mg  10 mg DAILY  Ayden Price M.D.        ipratropium-albuterol (DUONEB) nebulizer solution  3 mL Q4H PRN (RT) Roge Sol M.D.        metoprolol tartrate (Lopressor) tablet 25 mg  25 mg BID Dylan Moreno M.D.   25 mg at 03/03/25 0541    Metoprolol Tartrate (Lopressor) injection 5 mg  5 mg Q HOUR PRN Dylan Moreno M.D.        cefTRIAXone (Rocephin) syringe 2,000 mg  2,000 mg Q24HRS Dylan Moreno M.D.   2,000 mg at 03/03/25 0544    metroNIDAZOLE (Flagyl) IVPB 500 mg  500 mg Q12HRS Dylan Moreno M.D.   Stopped at 03/03/25 0646    amLODIPine (Norvasc) tablet 5 mg  5 mg Q DAY Oswaldo Chawla M.D.   5 mg at 03/03/25 0541    And    benazepril (Lotensin) tablet 20 mg  20 mg Q DAY Oswaldo Chawla M.D.   20 mg at 03/03/25 0539    heparin injection 5,000 Units  5,000 Units Q8HRS Oswaldo Chawla M.D.   5,000 Units at 03/03/25 0541    senna-docusate (Pericolace Or Senokot S) 8.6-50 MG per tablet 2 Tablet  2 Tablet Q EVENING Oswaldo Chawla M.D.   2 Tablet at 03/01/25 1741    And    polyethylene glycol/lytes (Miralax) Packet 1 Packet  1 Packet QDAY PRN Oswaldo Chawla M.D.   1 Packet at 03/02/25 0750    labetalol (Normodyne/Trandate) injection 10 mg  10 mg Q4HRS PRN Oswaldo Chawla M.D.   10 mg at 03/03/25 0417    ondansetron (Zofran) syringe/vial injection 4 mg  4 mg Q4HRS PRN Oswaldo Chawla M.D.   4 mg at 03/01/25 1551    Or    ondansetron (Zofran ODT) dispertab 4 mg  4 mg Q4HRS PRN Oswaldo Chawla M.D.   4 mg at 03/02/25 0817    acetaminophen (Tylenol) tablet 1,000 mg  1,000 mg Q6HRS Oswaldo Chawla M.D.   1,000 mg at 03/03/25 0427    Followed by    [START ON 3/4/2025] acetaminophen (Tylenol) tablet 1,000 mg  1,000 mg Q6HRS PRN Oswaldo Chawla M.D.        oxyCODONE immediate-release (Roxicodone) tablet 2.5 mg  2.5 mg Q4HRS PRN Oswaldo Chawla M.D.        Or    oxyCODONE immediate-release (Roxicodone) tablet 5 mg  5 mg Q4HRS PRN Oswaldo Chawla M.D.    "5 mg at 03/03/25 0551    Or    HYDROmorphone (Dilaudid) injection 0.25 mg  0.25 mg Q4HRS PRN Oswaldo Chawla M.D.   0.25 mg at 03/01/25 1539    digoxin (Lanoxin) tablet 62.5 mcg  62.5 mcg DAILY JARED SingerDBlanca   62.5 mcg at 03/03/25 0540    ezetimibe (Zetia) tablet 10 mg  10 mg DAILY JARED SingerDBlanca   10 mg at 03/03/25 0539    finasteride (Proscar) tablet 5 mg  5 mg DAILY JARED SingerDBlanca   5 mg at 03/03/25 0542    levothyroxine (Synthroid) tablet 100 mcg  100 mcg DAILY JARED SingerDBlanca   100 mcg at 03/03/25 0539    nitroglycerin (Nitrostat) tablet 0.4 mg  0.4 mg 1X PRN Oswaldo Chawla M.D.        omeprazole (PriLOSEC) capsule 40 mg  40 mg DAILY JARED SingerDBlanca   40 mg at 03/03/25 0539    rosuvastatin (Crestor) tablet 40 mg  40 mg DAILY CRISTIANO Singer.DBlanca   40 mg at 03/03/25 0540    tamsulosin (Flomax) capsule 0.4 mg  0.4 mg DAILY CRISTIANO Singer.DBlanca   0.4 mg at 03/03/25 0540   Last reviewed on 2/28/2025  1:04 PM by Merced Garcia formerly [facility]       Review of Systems:  Review of Systems   Constitutional:  Negative for chills, fever and malaise/fatigue.   HENT:  Negative for hearing loss.    Eyes:  Negative for blurred vision.   Respiratory:  Negative for cough and shortness of breath.    Cardiovascular:  Negative for chest pain and leg swelling.   Gastrointestinal:  Positive for abdominal pain. Negative for blood in stool, constipation, diarrhea, heartburn, melena, nausea and vomiting.   Genitourinary:  Positive for frequency. Negative for dysuria.   Musculoskeletal:  Negative for back pain.   Skin:  Negative for rash.   Neurological:  Negative for dizziness and weakness.   Psychiatric/Behavioral:  Negative for depression.    All other systems reviewed and are negative.        Vital signs:  Weight/BMI: Body mass index is 31.64 kg/m².  BP (!) 153/68   Pulse 78   Temp 36.2 °C (97.2 °F) (Temporal)   Resp (!) 26   Ht 1.6 m (5' 2.99\")   Wt 81 kg (178 lb " 9.2 oz)   SpO2 92%   Vitals:    03/03/25 0539 03/03/25 0541 03/03/25 0600 03/03/25 0651   BP: (!) 153/68 (!) 153/68 (!) 153/68    Pulse:   93 78   Resp:    (!) 26   Temp:   (P) 36.2 °C (97.2 °F)    TempSrc:   (P) Temporal    SpO2:   88% 92%   Weight:       Height:         Oxygen Therapy:  Pulse Oximetry: 92 %, O2 (LPM): 2, O2 Delivery Device: Silicone Nasal Cannula    Intake/Output Summary (Last 24 hours) at 3/3/2025 0740  Last data filed at 3/3/2025 0600  Gross per 24 hour   Intake --   Output 2106 ml   Net -2106 ml         Physical Exam:  Physical Exam  Vitals and nursing note reviewed.   Constitutional:       General: He is not in acute distress.     Appearance: Normal appearance. He is not ill-appearing.   HENT:      Head: Normocephalic and atraumatic.      Right Ear: External ear normal.      Left Ear: External ear normal.      Nose: Nose normal.      Mouth/Throat:      Mouth: Mucous membranes are moist.      Pharynx: Oropharynx is clear.   Eyes:      General: No scleral icterus.  Cardiovascular:      Rate and Rhythm: Normal rate. Rhythm irregular.      Pulses: Normal pulses.      Heart sounds: Normal heart sounds.   Pulmonary:      Effort: Pulmonary effort is normal.      Breath sounds: Normal breath sounds.   Abdominal:      General: Abdomen is flat. Bowel sounds are normal. There is distension.      Palpations: Abdomen is soft.      Tenderness: There is abdominal tenderness.   Musculoskeletal:         General: Normal range of motion.      Cervical back: Normal range of motion and neck supple.   Skin:     General: Skin is warm.      Capillary Refill: Capillary refill takes less than 2 seconds.   Neurological:      Mental Status: He is alert and oriented to person, place, and time.   Psychiatric:         Mood and Affect: Mood normal.         Behavior: Behavior normal.             Labs:  Recent Labs     03/01/25  1540 03/02/25  0907 03/03/25  0353   SODIUM 134* 135 138   POTASSIUM 4.1 3.9 4.3   CHLORIDE 102  103 105   CO2 15* 21 22   BUN 15 18 19   CREATININE 1.12 1.14 1.15   MAGNESIUM  --   --  1.9   PHOSPHORUS  --   --  2.1*   CALCIUM 9.0 7.6* 8.1*     Recent Labs     03/01/25  1540 03/02/25  0907 03/03/25  0353   ALTSGPT 179* 107* 89*   ASTSGOT 125* 67* 51*   ALKPHOSPHAT 148* 128* 145*   TBILIRUBIN 2.4* 0.9 0.7   LIPASE 209*  --   --    GLUCOSE 129* 110* 102*     Recent Labs     03/01/25  0726 03/01/25  1540 03/02/25  0907 03/03/25  0353   WBC 12.9*  --  9.8 10.4   NEUTSPOLYS 82.80*  --   --   --    LYMPHOCYTES 7.80*  --   --   --    MONOCYTES 8.20  --   --   --    EOSINOPHILS 0.20  --   --   --    BASOPHILS 0.50  --   --   --    ASTSGOT 114* 125* 67* 51*   ALTSGPT 157* 179* 107* 89*   ALKPHOSPHAT 107* 148* 128* 145*   TBILIRUBIN 2.0* 2.4* 0.9 0.7     Recent Labs     03/01/25  0726 03/02/25  0907 03/03/25 0353   RBC 4.11* 4.03* 4.25*   HEMOGLOBIN 12.2* 11.5* 12.2*   HEMATOCRIT 38.0* 35.3* 37.4*   PLATELETCT 203 185 220     Recent Results (from the past 24 hours)   Comp Metabolic Panel    Collection Time: 03/02/25  9:07 AM   Result Value Ref Range    Sodium 135 135 - 145 mmol/L    Potassium 3.9 3.6 - 5.5 mmol/L    Chloride 103 96 - 112 mmol/L    Co2 21 20 - 33 mmol/L    Anion Gap 11.0 7.0 - 16.0    Glucose 110 (H) 65 - 99 mg/dL    Bun 18 8 - 22 mg/dL    Creatinine 1.14 0.50 - 1.40 mg/dL    Calcium 7.6 (L) 8.5 - 10.5 mg/dL    Correct Calcium 8.4 (L) 8.5 - 10.5 mg/dL    AST(SGOT) 67 (H) 12 - 45 U/L    ALT(SGPT) 107 (H) 2 - 50 U/L    Alkaline Phosphatase 128 (H) 30 - 99 U/L    Total Bilirubin 0.9 0.1 - 1.5 mg/dL    Albumin 3.0 (L) 3.2 - 4.9 g/dL    Total Protein 5.7 (L) 6.0 - 8.2 g/dL    Globulin 2.7 1.9 - 3.5 g/dL    A-G Ratio 1.1 g/dL   CBC WITHOUT DIFFERENTIAL    Collection Time: 03/02/25  9:07 AM   Result Value Ref Range    WBC 9.8 4.8 - 10.8 K/uL    RBC 4.03 (L) 4.70 - 6.10 M/uL    Hemoglobin 11.5 (L) 14.0 - 18.0 g/dL    Hematocrit 35.3 (L) 42.0 - 52.0 %    MCV 87.6 81.4 - 97.8 fL    MCH 28.5 27.0 - 33.0 pg    MCHC  32.6 32.3 - 36.5 g/dL    RDW 45.8 35.9 - 50.0 fL    Platelet Count 185 164 - 446 K/uL    MPV 12.2 9.0 - 12.9 fL   ESTIMATED GFR    Collection Time: 25  9:07 AM   Result Value Ref Range    GFR (CKD-EPI) 62 >60 mL/min/1.73 m 2   EC-ECHOCARDIOGRAM COMPLETE W/ CONT    Collection Time: 25  1:38 PM   Result Value Ref Range    Eject.Frac. MOD BP 58.84     Eject.Frac. MOD 4C 60.8     Eject.Frac. MOD 2C 56    EKG    Collection Time: 25  7:40 PM   Result Value Ref Range    Report       [facility]    Test Date:  2025  Pt Name:    VARUN CISNEROS              Department: [facility]  MRN:        [id number]                      Room:       [facility] [place]  Gender:     Male                         Technician: MOIZ  :        1940                   Requested By:TRENA FLOWERS  Order #:    [id number]                    Reading MD: Trena Flowers    Measurements  Intervals                                Axis  Rate:       73                           P:          0  IN:         0                            QRS:        -45  QRSD:       112                          T:          -81  QT:         425  QTc:        469    Interpretive Statements  Atrial fibrillation  Incomplete left bundle branch block  Nonspecific ST-T wave abnormalities  Compared to ECG 2025 15:28:17  Q waves no longer present    Electronically Signed On 2025 19:40:19 PST by Trena Flowers     CBC WITHOUT DIFFERENTIAL    Collection Time: 25  3:53 AM   Result Value Ref Range    WBC 10.4 4.8 - 10.8 K/uL    RBC 4.25 (L) 4.70 - 6.10 M/uL    Hemoglobin 12.2 (L) 14.0 - 18.0 g/dL    Hematocrit 37.4 (L) 42.0 - 52.0 %    MCV 88.0 81.4 - 97.8 fL    MCH 28.7 27.0 - 33.0 pg    MCHC 32.6 32.3 - 36.5 g/dL    RDW 45.2 35.9 - 50.0 fL    Platelet Count 220 164 - 446 K/uL    MPV 11.8 9.0 - 12.9 fL   Comp Metabolic Panel    Collection Time: 25  3:53 AM   Result Value Ref Range    Sodium 138 135 - 145 mmol/L    Potassium 4.3 3.6 - 5.5 mmol/L    Chloride 105  96 - 112 mmol/L    Co2 22 20 - 33 mmol/L    Anion Gap 11.0 7.0 - 16.0    Glucose 102 (H) 65 - 99 mg/dL    Bun 19 8 - 22 mg/dL    Creatinine 1.15 0.50 - 1.40 mg/dL    Calcium 8.1 (L) 8.5 - 10.5 mg/dL    Correct Calcium 8.7 8.5 - 10.5 mg/dL    AST(SGOT) 51 (H) 12 - 45 U/L    ALT(SGPT) 89 (H) 2 - 50 U/L    Alkaline Phosphatase 145 (H) 30 - 99 U/L    Total Bilirubin 0.7 0.1 - 1.5 mg/dL    Albumin 3.3 3.2 - 4.9 g/dL    Total Protein 6.1 6.0 - 8.2 g/dL    Globulin 2.8 1.9 - 3.5 g/dL    A-G Ratio 1.2 g/dL   MAGNESIUM    Collection Time: 03/03/25  3:53 AM   Result Value Ref Range    Magnesium 1.9 1.5 - 2.5 mg/dL   PHOSPHORUS    Collection Time: 03/03/25  3:53 AM   Result Value Ref Range    Phosphorus 2.1 (L) 2.5 - 4.5 mg/dL   proBrain Natriuretic Peptide, NT    Collection Time: 03/03/25  3:53 AM   Result Value Ref Range    NT-proBNP 5968 (H) 0 - 125 pg/mL   ESTIMATED GFR    Collection Time: 03/03/25  3:53 AM   Result Value Ref Range    GFR (CKD-EPI) 62 >60 mL/min/1.73 m 2       Radiology Review:  EC-ECHOCARDIOGRAM COMPLETE W/ CONT   Final Result      CT-CHEST,ABDOMEN WITH   Final Result      1.  Cholelithiasis and mildly distended gallbladder. If there is concern for acute cholecystitis, nuclear medicine HIDA scan may be performed.   2.  Nonspecific mild biliary dilatation. No definite evidence of choledocholithiasis however correlate with biliary labs. If concern for obstruction, MRCP may be performed.   3.  Chest findings are described on concurrent CT angiogram chest report.      CT-CTA CHEST PULMONARY ARTERY W/ RECONS   Final Result         1.  No evidence of pulmonary embolism.   2.  Probable mild interstitial edema in the lung bases.   3.  Small bilateral pleural effusions with bibasilar atelectasis.   4.  A nonspecific probably cystic anterior mediastinal lesion, has a nonaggressive appearance however comparison with old outside prior exams would be helpful to evaluate for stability.      Fleischner Society pulmonary  nodule recommendations:         DX-CHEST-PORTABLE (1 VIEW)   Final Result         Patchy left basilar opacities, atelectasis or infection.      ER-SEOARAW-R/O   Final Result         1. No choledocholithiasis. Mildly dilated CBD.   2. Cholelithiasis. Mild stranding and fluid surrounding the gallbladder, concerning for cholecystitis.            MDM (Data Review):   -Records reviewed and summarized in current documentation  -I personally reviewed and interpreted the laboratory results  -I personally reviewed the radiology images    Assessment/Recommendations:  Gallstone pancreatitis  Cholelithiasis  CAD status post 16 stents  Aortic stenosis status post TAVR  Cardiomyopathy with EF of 45 to 50%   CKD 3 AA  Normocytic anemia  Transaminitis and elevated alkaline phosphatase  Hypertension  Hyperlipidemia  GERD  BPH  Epigastric pain with nausea and vomiting  Respiratory difficulty with upgrade to IMCU  Paroxysmal atrial fibrillation    Recommendations:  Given downtrending liver enzymes and normal T. bili, suspect that patient passed a stone  No indication for ERCP at this time  Pending cholecystectomy tomorrow  Further plan of care per primary and surgical team    [facility] signing off.  Please reconsult with any questions or concerns    Plan discussed with patient, Dr. Price, Dr. Willett    ..Ainsley Torres, DNP,  APRN    Core Quality Measures   Reviewed items::  Labs, Medications and Radiology reports reviewed

## 2025-03-03 NOTE — CARE PLAN
The patient is Stable - Low risk of patient condition declining or worsening    Shift Goals  Clinical Goals: hemodynamic stability, comfort  Patient Goals: rest  Family Goals: cassidy      Problem: Knowledge Deficit - Standard  Goal: Patient and family/care givers will demonstrate understanding of plan of care, disease process/condition, diagnostic tests and medications  3/3/2025 0330 by Bobby Riley R.N.  Outcome: Progressing  3/3/2025 0330 by Bobby Riley R.N.  Outcome: Progressing     Problem: Fall Risk  Goal: Patient will remain free from falls  3/3/2025 0330 by Bobby Riley R.N.  Outcome: Progressing  3/3/2025 0330 by Bobby Riley R.N.  Outcome: Progressing     Problem: Pain - Standard  Goal: Alleviation of pain or a reduction in pain to the patient’s comfort goal  3/3/2025 0330 by Bobby Riley R.N.  Outcome: Progressing  3/3/2025 0330 by Bobby Riley R.N.  Outcome: Progressing     Problem: Hemodynamics  Goal: Patient's hemodynamics, fluid balance and neurologic status will be stable or improve  3/3/2025 0330 by Bobby Riley R.N.  Outcome: Progressing  3/3/2025 0330 by Bobby Riley R.N.  Outcome: Progressing     Problem: Respiratory  Goal: Patient will achieve/maintain optimum respiratory ventilation and gas exchange  3/3/2025 0330 by Bobby Riley R.N.  Outcome: Progressing  3/3/2025 0330 by Bobby Riley R.N.  Outcome: Not Progressing         Problem: Respiratory  Goal: Patient will achieve/maintain optimum respiratory ventilation and gas exchange  3/3/2025 0330 by Bobby Riley R.N.  Outcome: Progressing  3/3/2025 0330 by Bobby Riley R.N.  Outcome: Not Progressing

## 2025-03-03 NOTE — PROGRESS NOTES
[facility] Medicine Daily Progress Note    Date of Service  3/3/2025    Chief Complaint  Enrico Cohen is a 84 y.o. male admitted 2/27/2025 with concern for gallstone pancreatitis    Hospital Course  Patient is an 84-year-old male with a past medical history of hypothyroidism, HTN, HLD, CAD status post 16 stents, PSA, GERD, aortic stenosis status post TAVR, CKD 3A, BPH that presented with epigastric pain with nausea and vomiting. He went golfing during the day, then came home with worsening pain. On arrival to ED he had one episode of vomiting. No history of similar pain/symptoms.  The patient was admitted to the medical team, was seen by the surgical team as well as gastroenterology in light of the patient's concern for gallbladder disease and possible obstructing stone leading to pancreatitis, the patient on the medical unit was given IV fluids, he developed respiratory difficulty on 3/1, was therefore upgraded to [facility] level of care.  The patient had adequate imaging and also including a CTA of the chest which was negative for pulm embolism the patient identified to have paroxysmal atrial fibrillation with intermittent RVR with heart rates in the 130s  The patient had fluids stopped, was given intravenous Lasix, was continued on digoxin for atrial fibrillation rate control, transferred to the [facility] level of care.  Surgery is intending to remove gallbladder secondary to the suspected cholecystitis once the patient is otherwise medically optimized    Interval Problem Update  Patient seen and examined today.  Data, Medication data reviewed.  Case discussed with nursing as available.  Plan of Care reviewed with patient and notified of changes.  3/2 the patient feels overall better, his abdomen is still somewhat firm, tender, the patient later in the morning had a bowel movement, hemodynamically improved, afebrile, heart rate in the 70s, atrial fibrillation, respiration unlabored, the patient is saturating in the  low 90s on 2 L nasal cannula oxygen, blood pressure 130s over 60s,  WBC 9.8 hemoglobin 11 platelet count 185 sodium 135 potassium 3.9 chloride 103 bicarb 21 glucose 110  Cardiology consulted for evaluation in terms of consideration of high surgical risk.  Awaiting follow-up echocardiogram  Reported as urgent since we need that vital piece of information for decision making in terms of operative clearance  3/3 the patient with ongoing abdominal pain, there is no rebound tenderness, cardiology has evaluated the patient, echocardiogram is fairly unchanged, the patient is a moderate surgical risk, updated surgery for evaluation and the patient will likely be put on the operating room schedule on 3/4  Restarting a diet, afebrile, heart rate in the 80s, respiration unlabored, the patient's blood pressure is in the 1 teens to 170s over 70s, the patient remains in a rate controlled atrial A-fib/flutter  Rate controlled  WBC 10.4 hemoglobin 12.2 platelet count 220 sodium 138 potassium 4.3 chloride 105 bicarb 22 glucose 102 BUN 19 creatinine 1.15 calcium 8.7 AST 51 improved ALT 89 improved alk phos 145 total bili 0.7 BNP 5968  Echocardiogram shows a EF of 50 to 55%, inferior wall appears akinetic, known TAVR appears to be functioning normally  I have discussed this patient's plan of care and discharge plan at IDT rounds today with Case Management, Nursing, Nursing leadership, and other members of the IDT team.    Consultants/Specialty  cardiology, critical care, general surgery, and GI    Code Status  Full Code    Disposition  The patient is not medically cleared for discharge to home or a post-acute facility.  Anticipate discharge to: home with close outpatient follow-up    I have placed the appropriate orders for post-discharge needs.    Review of Systems  Review of Systems   Constitutional:  Positive for malaise/fatigue. Negative for chills and fever.   HENT: Negative.     Eyes: Negative.    Respiratory:  Positive for  shortness of breath.    Cardiovascular: Negative.    Gastrointestinal:  Positive for abdominal pain, constipation and nausea. Negative for vomiting.   Genitourinary: Negative.    Musculoskeletal: Negative.    Skin: Negative.    Neurological: Negative.    Endo/Heme/Allergies: Negative.    Psychiatric/Behavioral:  The patient is nervous/anxious.    All other systems reviewed and are negative.       Physical Exam  Temp:  [35.6 °C (96 °F)-36.7 °C (98.1 °F)] (P) 36.2 °C (97.2 °F)  Pulse:  [70-93] 78  Resp:  [19-26] 26  BP: (117-211)/(56-90) 153/68  SpO2:  [88 %-96 %] 92 %    Physical Exam  Vitals and nursing note reviewed.   Constitutional:       Appearance: He is well-developed. He is ill-appearing.      Comments: Pt seen and examined.   HENT:      Head: Normocephalic and atraumatic.   Eyes:      Pupils: Pupils are equal, round, and reactive to light.   Cardiovascular:      Rate and Rhythm: Normal rate. Rhythm irregular.      Heart sounds: Murmur heard.   Pulmonary:      Effort: Pulmonary effort is normal.      Breath sounds: Rhonchi present.   Abdominal:      General: Bowel sounds are normal. There is distension.      Palpations: Abdomen is soft. There is no mass.      Tenderness: There is abdominal tenderness. There is no guarding.   Musculoskeletal:         General: Normal range of motion.      Cervical back: Normal range of motion and neck supple.   Skin:     General: Skin is warm and dry.   Neurological:      General: No focal deficit present.      Mental Status: He is alert and oriented to person, place, and time.      Motor: Weakness present.   Psychiatric:         Behavior: Behavior normal.         Fluids    Intake/Output Summary (Last 24 hours) at 3/3/2025 0831  Last data filed at 3/3/2025 0600  Gross per 24 hour   Intake --   Output 2106 ml   Net -2106 ml        Laboratory  Recent Labs     03/01/25  0726 03/02/25  0907 03/03/25  0353   WBC 12.9* 9.8 10.4   RBC 4.11* 4.03* 4.25*   HEMOGLOBIN 12.2* 11.5* 12.2*    HEMATOCRIT 38.0* 35.3* 37.4*   MCV 92.5 87.6 88.0   MCH 29.7 28.5 28.7   MCHC 32.1* 32.6 32.6   RDW 47.9 45.8 45.2   PLATELETCT 203 185 220   MPV 12.4 12.2 11.8     Recent Labs     03/01/25  1540 03/02/25  0907 03/03/25  0353   SODIUM 134* 135 138   POTASSIUM 4.1 3.9 4.3   CHLORIDE 102 103 105   CO2 15* 21 22   GLUCOSE 129* 110* 102*   BUN 15 18 19   CREATININE 1.12 1.14 1.15   CALCIUM 9.0 7.6* 8.1*                       Imaging  EC-ECHOCARDIOGRAM COMPLETE W/ CONT   Final Result      CT-CHEST,ABDOMEN WITH   Final Result      1.  Cholelithiasis and mildly distended gallbladder. If there is concern for acute cholecystitis, nuclear medicine HIDA scan may be performed.   2.  Nonspecific mild biliary dilatation. No definite evidence of choledocholithiasis however correlate with biliary labs. If concern for obstruction, MRCP may be performed.   3.  Chest findings are described on concurrent CT angiogram chest report.      CT-CTA CHEST PULMONARY ARTERY W/ RECONS   Final Result         1.  No evidence of pulmonary embolism.   2.  Probable mild interstitial edema in the lung bases.   3.  Small bilateral pleural effusions with bibasilar atelectasis.   4.  A nonspecific probably cystic anterior mediastinal lesion, has a nonaggressive appearance however comparison with old outside prior exams would be helpful to evaluate for stability.      Fleischner Society pulmonary nodule recommendations:         DX-CHEST-PORTABLE (1 VIEW)   Final Result         Patchy left basilar opacities, atelectasis or infection.      NL-CWAARQD-K/O   Final Result         1. No choledocholithiasis. Mildly dilated CBD.   2. Cholelithiasis. Mild stranding and fluid surrounding the gallbladder, concerning for cholecystitis.           Assessment/Plan  * Gallstone pancreatitis- (present on admission)  Assessment & Plan  Elevated lipase likely passed stone  No significant evidence of radiographic pancreatitis as per MRCP  MRCP negative for obstructing  lesion  Positive cholecystitis  with need for cholecystectomy, likely to happen on 3/5  Monitor closely  Avoid volume overload  Medical treatment otherwise for now      Paroxysmal atrial fibrillation (HCC)- (present on admission)  Assessment & Plan  Not in RVR currently  - Continue home digoxin  - Hold home rivaroxaban presurgically    Benign prostatic hyperplasia- (present on admission)  Assessment & Plan  - Continue home tamsulosin    Stage 3a chronic kidney disease (CKD)- (present on admission)  Assessment & Plan  Baseline 1.3.  - Avoid nephrotoxic medications  - Renally dose medications as appropriate    Coronary artery disease involving native coronary artery of native heart without angina pectoris- (present on admission)  Assessment & Plan  Status post 16 stents  Hold home Plavix and rivaroxaban pending likely procedure  Reevaluate by echocardiography, unchanged  Cardiology eval for preoperative evaluation, deemed moderate risk, the patient is excepting the current risk    Obstructive sleep apnea- (present on admission)  Assessment & Plan  - CPAP    History of transcatheter aortic valve replacement (TAVR)- (present on admission)  Assessment & Plan  Status post TAVR.    GERD (gastroesophageal reflux disease)- (present on admission)  Assessment & Plan  -Continue home omeprazole    Cardiovascular disease- (present on admission)  Assessment & Plan  Cardiology evaluation for preoperative risk assessment and optimization  Echocardiogram is unchanged, the patient verbalized a moderate  intervention risk    Mixed hyperlipidemia- (present on admission)  Assessment & Plan    - Continue home Rosuvastatin    Essential hypertension- (present on admission)  Assessment & Plan  - Continue home amlodipine-benazepril  - As needed labetalol    Plan  3/3/2025  Continue gentle pain control,  GI remedies for bowel management and pain control  Continue rate control for chronic atrial fibrillation  Wean oxygen further, forced  diuresis  Ongoing PPI  Empiric antibiotics  Cardiology eval for preoperative risk assessment deemed the patient a moderate risk  Cardiogram without significant change,  Surgery informed that the patient will be placed on the surgery schedule on 3/4  See orders  Patient is has a high medical complexity, complex decision making and is at high risk for complication, morbidity, and mortality.  I spent 54 minutes, reviewing the chart, obtaining and/or reviewing separately obtained history. Performing a medically appropriate examination and evaluation.  Counseling and educating the patient. Ordering and reviewing medications, tests, or procedures.   Documenting clinical information in EPIC. Independently interpreting results and communicating results to patient. Discussing future disposition of care with patient, RN and case management.    VTE prophylaxis: Lovenox     I have performed a physical exam and reviewed and updated ROS and Plan today (3/3/2025). In review of yesterday's note (3/2/2025), there are no changes except as documented above.      Please note that this dictation was created using voice recognition software. I have made every reasonable attempt to correct obvious errors, but I expect that there are errors of grammar and possibly context that I did not discover before finalizing the note.

## 2025-03-03 NOTE — PROGRESS NOTES
CARDIOLOGY UPDATE:  Consulted on this patient with acute gallstone pancreatitis for preoperative evaluation prior to a nonelective urgent surgery.  Repeat echocardiogram shows no significant change from baseline.  Patient remains at low to moderate nonmodifiable cardiac risk for planned nonelective surgical intervention.    Thank you for the consultation. Cardiology will sign off. Please call with any questions.    Khoi Nieves MD, FACC, [facility]  Division of Interventional Cardiology  [facility]

## 2025-03-03 NOTE — PROGRESS NOTES
On assessment pinpoint pupil on R eye noticed, L eye 3mm and reactive to light . Dr Dylan Langford notified of finding, came to bedside and performed assessment on patent. Dr thorpe neuro checks q4h x2.

## 2025-03-03 NOTE — CARE PLAN
Problem: Pain - Standard  Goal: Alleviation of pain or a reduction in pain to the patient’s comfort goal  Note:  Document pain using the appropriate pain scale per order or unit policy. Educate and implement non-pharmacologic comfort measures (i.e. relaxation, distraction, massage, cold/heat therapy, etc.) . Pain management medications as ordered . Reassess pain after pain med administration per policy.     Per patient, pain is much better today, will continue to monitor at this time and continue to assess per protocol.       Problem: Hemodynamics  Goal: Patient's hemodynamics, fluid balance and neurologic status will be stable or improve  Outcome: Progressing  Note:  Monitor vital signs, pulse oximetry and cardiac monitor per provider order and/or policy . Maintain blood pressure per provider order . Hemodynamic monitoring per provider order. Monitor intake and output. Daily weights per unit policy or provider order.     Will continue to monitor vitals and manage appropriately per order.      Problem: Respiratory  Goal: Patient will achieve/maintain optimum respiratory ventilation and gas exchange  Note: At the start of the shift patient was on 2L of oxygen and was weaned down to 1L. Currently using the IS and is effective, goal for the IS is 2000 mL.            The patient is Stable - Low risk of patient condition declining or worsening    Shift Goals  Clinical Goals: Monitor Vitals, possible surgery  Patient Goals: POC  Family Goals: POC    Progress made toward(s) clinical / shift goals:      Patient is not progressing towards the following goals:

## 2025-03-03 NOTE — PROGRESS NOTES
"  DATE: 3/3/2025      Hospital Day 5 gallstone pancreatitis     INTERVAL EVENTS:  No modifiable risk factors per cardiology, patient is low to intermediate risk for perioperative complications  Xarelto held  Patient remains in rate controlled A-fib    PHYSICAL EXAMINATION:  Vital Signs: BP (!) 144/67   Pulse 78   Temp 36.2 °C (97.2 °F) (Temporal)   Resp (!) 26   Ht 1.6 m (5' 2.99\")   Wt 81 kg (178 lb 9.2 oz)   SpO2 92%   CONSTITUTIONAL: Alert, awake, oriented x3.  HEENT: Normocephalic, atraumatic. PERRL. Conjunctivae normal.  NECK: Supple  CARDIOVASCULAR: Normal rate, regular rhythm.  PULMONARY/CHEST: No respiratory distress. Chest wall stable, non-tender, normal excursion.  ABDOMEN: Soft, non-distended, mildly tender to palpation in the right upper quadrant.  MUSCULOSKELETAL: Moving all extremities.  NEUROLOGICAL: CNII-XII grossly intact, no focal deficits.  SKIN: Warm and dry. No abrasions, lacerations.  PSYCHIATRIC: Behavior appropriate for situation.      ASSESSMENT AND PLAN:  This is an 84-year-old male with significant cardiac history presenting with gallstone pancreatitis.    * Gallstone pancreatitis- (present on admission)  Assessment & Plan  Pancreatitis on admission: Remains tender.  From a biliary standpoint, would recommend cholecystectomy during this hospitalization.    Will proceed with cholecystectomy tomorrow.  NPO past midnight      Coronary artery disease involving native coronary artery of native heart without angina pectoris- (present on admission)  Assessment & Plan  History of severe coronary artery disease.  Patient states he has 16 stents.  Last echo in 2023 showed LVEF 45%.  Repeat echo performed, patient is low to intermediate risk for perioperative complications per cardiology      __________________________________     Volodymyr Parish M.D.    DD: 3/2/2025  8:59 AM    "

## 2025-03-04 ENCOUNTER — ANESTHESIA (OUTPATIENT)
Dept: SURGERY | Facility: MEDICAL CENTER | Age: 85
DRG: 417 | End: 2025-03-04
Payer: COMMERCIAL

## 2025-03-04 PROBLEM — K81.9 CHOLECYSTITIS: Status: ACTIVE | Noted: 2025-03-04

## 2025-03-04 LAB
ANION GAP SERPL CALC-SCNC: 11 MMOL/L (ref 7–16)
BUN SERPL-MCNC: 16 MG/DL (ref 8–22)
CALCIUM SERPL-MCNC: 8.7 MG/DL (ref 8.5–10.5)
CHLORIDE SERPL-SCNC: 105 MMOL/L (ref 96–112)
CO2 SERPL-SCNC: 21 MMOL/L (ref 20–33)
CREAT SERPL-MCNC: 0.99 MG/DL (ref 0.5–1.4)
ERYTHROCYTE [DISTWIDTH] IN BLOOD BY AUTOMATED COUNT: 45.3 FL (ref 35.9–50)
GFR SERPLBLD CREATININE-BSD FMLA CKD-EPI: 74 ML/MIN/1.73 M 2
GLUCOSE SERPL-MCNC: 106 MG/DL (ref 65–99)
HCT VFR BLD AUTO: 39.8 % (ref 42–52)
HGB BLD-MCNC: 12.9 G/DL (ref 14–18)
MAGNESIUM SERPL-MCNC: 2.1 MG/DL (ref 1.5–2.5)
MCH RBC QN AUTO: 29 PG (ref 27–33)
MCHC RBC AUTO-ENTMCNC: 32.4 G/DL (ref 32.3–36.5)
MCV RBC AUTO: 89.4 FL (ref 81.4–97.8)
NT-PROBNP SERPL IA-MCNC: 4546 PG/ML (ref 0–125)
PATHOLOGY CONSULT NOTE: NORMAL
PHOSPHATE SERPL-MCNC: 2.8 MG/DL (ref 2.5–4.5)
PLATELET # BLD AUTO: 234 K/UL (ref 164–446)
PMV BLD AUTO: 11.8 FL (ref 9–12.9)
POTASSIUM SERPL-SCNC: 3.9 MMOL/L (ref 3.6–5.5)
RBC # BLD AUTO: 4.45 M/UL (ref 4.7–6.1)
SODIUM SERPL-SCNC: 137 MMOL/L (ref 135–145)
WBC # BLD AUTO: 9.1 K/UL (ref 4.8–10.8)

## 2025-03-04 PROCEDURE — 83735 ASSAY OF MAGNESIUM: CPT

## 2025-03-04 PROCEDURE — 85027 COMPLETE CBC AUTOMATED: CPT

## 2025-03-04 PROCEDURE — 88304 TISSUE EXAM BY PATHOLOGIST: CPT | Performed by: PATHOLOGY

## 2025-03-04 PROCEDURE — 502714 HCHG ROBOTIC SURGERY SERVICES: Performed by: STUDENT IN AN ORGANIZED HEALTH CARE EDUCATION/TRAINING PROGRAM

## 2025-03-04 PROCEDURE — 700102 HCHG RX REV CODE 250 W/ 637 OVERRIDE(OP): Performed by: INTERNAL MEDICINE

## 2025-03-04 PROCEDURE — 700111 HCHG RX REV CODE 636 W/ 250 OVERRIDE (IP): Mod: JZ | Performed by: HOSPITALIST

## 2025-03-04 PROCEDURE — 700102 HCHG RX REV CODE 250 W/ 637 OVERRIDE(OP): Performed by: STUDENT IN AN ORGANIZED HEALTH CARE EDUCATION/TRAINING PROGRAM

## 2025-03-04 PROCEDURE — 700105 HCHG RX REV CODE 258: Performed by: STUDENT IN AN ORGANIZED HEALTH CARE EDUCATION/TRAINING PROGRAM

## 2025-03-04 PROCEDURE — 160015 HCHG STAT PREOP MINUTES: Performed by: STUDENT IN AN ORGANIZED HEALTH CARE EDUCATION/TRAINING PROGRAM

## 2025-03-04 PROCEDURE — A9270 NON-COVERED ITEM OR SERVICE: HCPCS | Performed by: HOSPITALIST

## 2025-03-04 PROCEDURE — 160042 HCHG SURGERY MINUTES - EA ADDL 1 MIN LEVEL 5: Performed by: STUDENT IN AN ORGANIZED HEALTH CARE EDUCATION/TRAINING PROGRAM

## 2025-03-04 PROCEDURE — 83880 ASSAY OF NATRIURETIC PEPTIDE: CPT

## 2025-03-04 PROCEDURE — 700102 HCHG RX REV CODE 250 W/ 637 OVERRIDE(OP): Performed by: HOSPITALIST

## 2025-03-04 PROCEDURE — 88304 TISSUE EXAM BY PATHOLOGIST: CPT | Mod: 26 | Performed by: PATHOLOGY

## 2025-03-04 PROCEDURE — A9270 NON-COVERED ITEM OR SERVICE: HCPCS | Performed by: INTERNAL MEDICINE

## 2025-03-04 PROCEDURE — A9270 NON-COVERED ITEM OR SERVICE: HCPCS | Performed by: STUDENT IN AN ORGANIZED HEALTH CARE EDUCATION/TRAINING PROGRAM

## 2025-03-04 PROCEDURE — 84100 ASSAY OF PHOSPHORUS: CPT

## 2025-03-04 PROCEDURE — 700105 HCHG RX REV CODE 258: Performed by: INTERNAL MEDICINE

## 2025-03-04 PROCEDURE — 700104 HCHG RX REV CODE 254: Performed by: STUDENT IN AN ORGANIZED HEALTH CARE EDUCATION/TRAINING PROGRAM

## 2025-03-04 PROCEDURE — 700111 HCHG RX REV CODE 636 W/ 250 OVERRIDE (IP): Mod: JZ | Performed by: STUDENT IN AN ORGANIZED HEALTH CARE EDUCATION/TRAINING PROGRAM

## 2025-03-04 PROCEDURE — 160002 HCHG RECOVERY MINUTES (STAT): Performed by: STUDENT IN AN ORGANIZED HEALTH CARE EDUCATION/TRAINING PROGRAM

## 2025-03-04 PROCEDURE — 160031 HCHG SURGERY MINUTES - 1ST 30 MINS LEVEL 5: Performed by: STUDENT IN AN ORGANIZED HEALTH CARE EDUCATION/TRAINING PROGRAM

## 2025-03-04 PROCEDURE — 160035 HCHG PACU - 1ST 60 MINS PHASE I: Performed by: STUDENT IN AN ORGANIZED HEALTH CARE EDUCATION/TRAINING PROGRAM

## 2025-03-04 PROCEDURE — 700111 HCHG RX REV CODE 636 W/ 250 OVERRIDE (IP): Performed by: INTERNAL MEDICINE

## 2025-03-04 PROCEDURE — C1894 INTRO/SHEATH, NON-LASER: HCPCS | Performed by: STUDENT IN AN ORGANIZED HEALTH CARE EDUCATION/TRAINING PROGRAM

## 2025-03-04 PROCEDURE — 700101 HCHG RX REV CODE 250: Performed by: STUDENT IN AN ORGANIZED HEALTH CARE EDUCATION/TRAINING PROGRAM

## 2025-03-04 PROCEDURE — 700111 HCHG RX REV CODE 636 W/ 250 OVERRIDE (IP): Performed by: STUDENT IN AN ORGANIZED HEALTH CARE EDUCATION/TRAINING PROGRAM

## 2025-03-04 PROCEDURE — 99232 SBSQ HOSP IP/OBS MODERATE 35: CPT | Performed by: HOSPITALIST

## 2025-03-04 PROCEDURE — 80048 BASIC METABOLIC PNL TOTAL CA: CPT

## 2025-03-04 PROCEDURE — 0FT44ZZ RESECTION OF GALLBLADDER, PERCUTANEOUS ENDOSCOPIC APPROACH: ICD-10-PCS | Performed by: STUDENT IN AN ORGANIZED HEALTH CARE EDUCATION/TRAINING PROGRAM

## 2025-03-04 PROCEDURE — 770020 HCHG ROOM/CARE - TELE (206)

## 2025-03-04 PROCEDURE — 160009 HCHG ANES TIME/MIN: Performed by: STUDENT IN AN ORGANIZED HEALTH CARE EDUCATION/TRAINING PROGRAM

## 2025-03-04 PROCEDURE — 47562 LAPAROSCOPIC CHOLECYSTECTOMY: CPT | Performed by: STUDENT IN AN ORGANIZED HEALTH CARE EDUCATION/TRAINING PROGRAM

## 2025-03-04 PROCEDURE — 160048 HCHG OR STATISTICAL LEVEL 1-5: Performed by: STUDENT IN AN ORGANIZED HEALTH CARE EDUCATION/TRAINING PROGRAM

## 2025-03-04 PROCEDURE — 99232 SBSQ HOSP IP/OBS MODERATE 35: CPT | Mod: 57 | Performed by: STUDENT IN AN ORGANIZED HEALTH CARE EDUCATION/TRAINING PROGRAM

## 2025-03-04 PROCEDURE — 8E0W4CZ ROBOTIC ASSISTED PROCEDURE OF TRUNK REGION, PERCUTANEOUS ENDOSCOPIC APPROACH: ICD-10-PCS | Performed by: STUDENT IN AN ORGANIZED HEALTH CARE EDUCATION/TRAINING PROGRAM

## 2025-03-04 RX ORDER — BUPIVACAINE HYDROCHLORIDE AND EPINEPHRINE 5; 5 MG/ML; UG/ML
INJECTION, SOLUTION EPIDURAL; INTRACAUDAL; PERINEURAL
Status: DISCONTINUED | OUTPATIENT
Start: 2025-03-04 | End: 2025-03-04 | Stop reason: HOSPADM

## 2025-03-04 RX ORDER — ONDANSETRON 2 MG/ML
INJECTION INTRAMUSCULAR; INTRAVENOUS PRN
Status: DISCONTINUED | OUTPATIENT
Start: 2025-03-04 | End: 2025-03-04 | Stop reason: SURG

## 2025-03-04 RX ORDER — INDOCYANINE GREEN AND WATER 25 MG
7.5 KIT INJECTION ONCE
Status: COMPLETED | OUTPATIENT
Start: 2025-03-04 | End: 2025-03-04

## 2025-03-04 RX ORDER — IPRATROPIUM BROMIDE AND ALBUTEROL SULFATE 2.5; .5 MG/3ML; MG/3ML
3 SOLUTION RESPIRATORY (INHALATION)
Status: DISCONTINUED | OUTPATIENT
Start: 2025-03-04 | End: 2025-03-04 | Stop reason: HOSPADM

## 2025-03-04 RX ORDER — HYDRALAZINE HYDROCHLORIDE 20 MG/ML
5 INJECTION INTRAMUSCULAR; INTRAVENOUS
Status: DISCONTINUED | OUTPATIENT
Start: 2025-03-04 | End: 2025-03-04 | Stop reason: HOSPADM

## 2025-03-04 RX ORDER — HYDROMORPHONE HYDROCHLORIDE 1 MG/ML
0.2 INJECTION, SOLUTION INTRAMUSCULAR; INTRAVENOUS; SUBCUTANEOUS
Status: DISCONTINUED | OUTPATIENT
Start: 2025-03-04 | End: 2025-03-04 | Stop reason: HOSPADM

## 2025-03-04 RX ORDER — SODIUM CHLORIDE, SODIUM LACTATE, POTASSIUM CHLORIDE, CALCIUM CHLORIDE 600; 310; 30; 20 MG/100ML; MG/100ML; MG/100ML; MG/100ML
INJECTION, SOLUTION INTRAVENOUS
Status: DISCONTINUED | OUTPATIENT
Start: 2025-03-04 | End: 2025-03-04 | Stop reason: SURG

## 2025-03-04 RX ORDER — OXYCODONE HCL 5 MG/5 ML
10 SOLUTION, ORAL ORAL
Status: COMPLETED | OUTPATIENT
Start: 2025-03-04 | End: 2025-03-04

## 2025-03-04 RX ORDER — CEFAZOLIN SODIUM 1 G/3ML
INJECTION, POWDER, FOR SOLUTION INTRAMUSCULAR; INTRAVENOUS PRN
Status: DISCONTINUED | OUTPATIENT
Start: 2025-03-04 | End: 2025-03-04 | Stop reason: SURG

## 2025-03-04 RX ORDER — DIPHENHYDRAMINE HYDROCHLORIDE 50 MG/ML
12.5 INJECTION, SOLUTION INTRAMUSCULAR; INTRAVENOUS
Status: DISCONTINUED | OUTPATIENT
Start: 2025-03-04 | End: 2025-03-04 | Stop reason: HOSPADM

## 2025-03-04 RX ORDER — POTASSIUM CHLORIDE 1500 MG/1
40 TABLET, EXTENDED RELEASE ORAL ONCE
Status: COMPLETED | OUTPATIENT
Start: 2025-03-04 | End: 2025-03-04

## 2025-03-04 RX ORDER — MAGNESIUM SULFATE HEPTAHYDRATE 40 MG/ML
INJECTION, SOLUTION INTRAVENOUS PRN
Status: DISCONTINUED | OUTPATIENT
Start: 2025-03-04 | End: 2025-03-04 | Stop reason: SURG

## 2025-03-04 RX ORDER — ROCURONIUM BROMIDE 10 MG/ML
INJECTION, SOLUTION INTRAVENOUS PRN
Status: DISCONTINUED | OUTPATIENT
Start: 2025-03-04 | End: 2025-03-04 | Stop reason: SURG

## 2025-03-04 RX ORDER — OXYCODONE HCL 5 MG/5 ML
5 SOLUTION, ORAL ORAL
Status: COMPLETED | OUTPATIENT
Start: 2025-03-04 | End: 2025-03-04

## 2025-03-04 RX ORDER — HALOPERIDOL 5 MG/ML
1 INJECTION INTRAMUSCULAR
Status: DISCONTINUED | OUTPATIENT
Start: 2025-03-04 | End: 2025-03-04 | Stop reason: HOSPADM

## 2025-03-04 RX ORDER — HYDROMORPHONE HYDROCHLORIDE 1 MG/ML
0.4 INJECTION, SOLUTION INTRAMUSCULAR; INTRAVENOUS; SUBCUTANEOUS
Status: DISCONTINUED | OUTPATIENT
Start: 2025-03-04 | End: 2025-03-04 | Stop reason: HOSPADM

## 2025-03-04 RX ORDER — LIDOCAINE HYDROCHLORIDE 20 MG/ML
INJECTION, SOLUTION EPIDURAL; INFILTRATION; INTRACAUDAL; PERINEURAL PRN
Status: DISCONTINUED | OUTPATIENT
Start: 2025-03-04 | End: 2025-03-04 | Stop reason: SURG

## 2025-03-04 RX ORDER — ESMOLOL HYDROCHLORIDE 10 MG/ML
INJECTION INTRAVENOUS PRN
Status: DISCONTINUED | OUTPATIENT
Start: 2025-03-04 | End: 2025-03-04 | Stop reason: SURG

## 2025-03-04 RX ORDER — HYDROMORPHONE HYDROCHLORIDE 1 MG/ML
0.1 INJECTION, SOLUTION INTRAMUSCULAR; INTRAVENOUS; SUBCUTANEOUS
Status: DISCONTINUED | OUTPATIENT
Start: 2025-03-04 | End: 2025-03-04 | Stop reason: HOSPADM

## 2025-03-04 RX ADMIN — OMEPRAZOLE 40 MG: 20 CAPSULE, DELAYED RELEASE ORAL at 05:37

## 2025-03-04 RX ADMIN — PROPOFOL 50 MG: 10 INJECTION, EMULSION INTRAVENOUS at 11:00

## 2025-03-04 RX ADMIN — THIAMINE HYDROCHLORIDE 200 MG: 100 INJECTION, SOLUTION INTRAMUSCULAR; INTRAVENOUS at 17:10

## 2025-03-04 RX ADMIN — THIAMINE HYDROCHLORIDE 200 MG: 100 INJECTION, SOLUTION INTRAMUSCULAR; INTRAVENOUS at 05:38

## 2025-03-04 RX ADMIN — ROCURONIUM BROMIDE 30 MG: 10 INJECTION INTRAVENOUS at 11:26

## 2025-03-04 RX ADMIN — Medication 25 MG: at 11:52

## 2025-03-04 RX ADMIN — SUGAMMADEX 200 MG: 100 INJECTION, SOLUTION INTRAVENOUS at 12:53

## 2025-03-04 RX ADMIN — MAGNESIUM SULFATE HEPTAHYDRATE 2 G: 2 INJECTION, SOLUTION INTRAVENOUS at 11:35

## 2025-03-04 RX ADMIN — CEFTRIAXONE SODIUM 2000 MG: 10 INJECTION, POWDER, FOR SOLUTION INTRAVENOUS at 05:38

## 2025-03-04 RX ADMIN — LIDOCAINE HYDROCHLORIDE 50 MG: 20 INJECTION, SOLUTION EPIDURAL; INFILTRATION; INTRACAUDAL; PERINEURAL at 11:00

## 2025-03-04 RX ADMIN — LEVOTHYROXINE SODIUM 100 MCG: 0.1 TABLET ORAL at 05:37

## 2025-03-04 RX ADMIN — ACETAMINOPHEN 1000 MG: 500 TABLET ORAL at 00:22

## 2025-03-04 RX ADMIN — FINASTERIDE 5 MG: 5 TABLET, FILM COATED ORAL at 05:37

## 2025-03-04 RX ADMIN — ESMOLOL HYDROCHLORIDE 50 MG: 100 INJECTION, SOLUTION INTRAVENOUS at 11:35

## 2025-03-04 RX ADMIN — EZETIMIBE 10 MG: 10 TABLET ORAL at 05:37

## 2025-03-04 RX ADMIN — HYDRALAZINE HYDROCHLORIDE 5 MG: 20 INJECTION, SOLUTION INTRAMUSCULAR; INTRAVENOUS at 14:10

## 2025-03-04 RX ADMIN — POTASSIUM CHLORIDE 40 MEQ: 1500 TABLET, EXTENDED RELEASE ORAL at 08:30

## 2025-03-04 RX ADMIN — ONDANSETRON 4 MG: 2 INJECTION INTRAMUSCULAR; INTRAVENOUS at 21:57

## 2025-03-04 RX ADMIN — TAMSULOSIN HYDROCHLORIDE 0.8 MG: 0.4 CAPSULE ORAL at 05:38

## 2025-03-04 RX ADMIN — FENTANYL CITRATE 50 MCG: 50 INJECTION, SOLUTION INTRAMUSCULAR; INTRAVENOUS at 12:56

## 2025-03-04 RX ADMIN — INDOCYANINE GREEN AND WATER 7.5 MG: KIT at 10:18

## 2025-03-04 RX ADMIN — ENOXAPARIN SODIUM 40 MG: 100 INJECTION SUBCUTANEOUS at 17:09

## 2025-03-04 RX ADMIN — METOPROLOL TARTRATE 25 MG: 25 TABLET, FILM COATED ORAL at 17:10

## 2025-03-04 RX ADMIN — FENTANYL CITRATE 25 MCG: 50 INJECTION, SOLUTION INTRAMUSCULAR; INTRAVENOUS at 14:52

## 2025-03-04 RX ADMIN — METRONIDAZOLE 500 MG: 500 TABLET ORAL at 17:10

## 2025-03-04 RX ADMIN — ROCURONIUM BROMIDE 20 MG: 10 INJECTION INTRAVENOUS at 11:56

## 2025-03-04 RX ADMIN — NOREPINEPHRINE BITARTRATE 0.05 MCG/KG/MIN: 1 INJECTION, SOLUTION, CONCENTRATE INTRAVENOUS at 10:58

## 2025-03-04 RX ADMIN — METOPROLOL TARTRATE 25 MG: 25 TABLET, FILM COATED ORAL at 05:37

## 2025-03-04 RX ADMIN — ONDANSETRON 8 MG: 2 INJECTION INTRAMUSCULAR; INTRAVENOUS at 12:53

## 2025-03-04 RX ADMIN — ROSUVASTATIN CALCIUM 40 MG: 20 TABLET, FILM COATED ORAL at 05:36

## 2025-03-04 RX ADMIN — DIGOXIN 62.5 MCG: 0.12 TABLET ORAL at 05:38

## 2025-03-04 RX ADMIN — FENTANYL CITRATE 25 MCG: 50 INJECTION, SOLUTION INTRAMUSCULAR; INTRAVENOUS at 12:55

## 2025-03-04 RX ADMIN — BENAZEPRIL HYDROCHLORIDE 20 MG: 20 TABLET, FILM COATED ORAL at 05:38

## 2025-03-04 RX ADMIN — ACETAMINOPHEN 1000 MG: 500 TABLET ORAL at 05:37

## 2025-03-04 RX ADMIN — AMLODIPINE BESYLATE 5 MG: 10 TABLET ORAL at 05:38

## 2025-03-04 RX ADMIN — OXYCODONE HYDROCHLORIDE 5 MG: 5 SOLUTION ORAL at 14:47

## 2025-03-04 RX ADMIN — OXYCODONE HYDROCHLORIDE 5 MG: 5 SOLUTION ORAL at 13:32

## 2025-03-04 RX ADMIN — SENNOSIDES AND DOCUSATE SODIUM 2 TABLET: 50; 8.6 TABLET ORAL at 17:10

## 2025-03-04 RX ADMIN — METRONIDAZOLE 500 MG: 500 TABLET ORAL at 05:38

## 2025-03-04 RX ADMIN — ACETAMINOPHEN 1000 MG: 500 TABLET ORAL at 23:59

## 2025-03-04 RX ADMIN — FENTANYL CITRATE 25 MCG: 50 INJECTION, SOLUTION INTRAMUSCULAR; INTRAVENOUS at 13:00

## 2025-03-04 RX ADMIN — SODIUM CHLORIDE, POTASSIUM CHLORIDE, SODIUM LACTATE AND CALCIUM CHLORIDE: 600; 310; 30; 20 INJECTION, SOLUTION INTRAVENOUS at 10:55

## 2025-03-04 RX ADMIN — FENTANYL CITRATE 25 MCG: 50 INJECTION, SOLUTION INTRAMUSCULAR; INTRAVENOUS at 14:49

## 2025-03-04 RX ADMIN — CEFAZOLIN 2 G: 1 INJECTION, POWDER, FOR SOLUTION INTRAMUSCULAR; INTRAVENOUS at 11:06

## 2025-03-04 RX ADMIN — FENTANYL CITRATE 25 MCG: 50 INJECTION, SOLUTION INTRAMUSCULAR; INTRAVENOUS at 13:32

## 2025-03-04 RX ADMIN — ROCURONIUM BROMIDE 50 MG: 10 INJECTION INTRAVENOUS at 11:01

## 2025-03-04 ASSESSMENT — PAIN DESCRIPTION - PAIN TYPE
TYPE: ACUTE PAIN
TYPE: SURGICAL PAIN
TYPE: SURGICAL PAIN
TYPE: ACUTE PAIN
TYPE: SURGICAL PAIN
TYPE: ACUTE PAIN
TYPE: SURGICAL PAIN
TYPE: ACUTE PAIN
TYPE: SURGICAL PAIN

## 2025-03-04 ASSESSMENT — ENCOUNTER SYMPTOMS
NAUSEA: 0
PALPITATIONS: 0
ABDOMINAL PAIN: 1
VOMITING: 0
BACK PAIN: 0
FEVER: 0
HEADACHES: 0
SHORTNESS OF BREATH: 0
NERVOUS/ANXIOUS: 0

## 2025-03-04 ASSESSMENT — FIBROSIS 4 INDEX: FIB4 SCORE: 1.99

## 2025-03-04 ASSESSMENT — PAIN SCALES - GENERAL: PAIN_LEVEL: 2

## 2025-03-04 NOTE — OP REPORT
DATE OF OPERATION:  3/4/2025    PREOPERATIVE DIAGNOSIS:  Gallstone pancreatitis     POSTOPERATIVE DIAGNOSIS: Gallstone pancreatitis, chronic cholecystitis    PROCEDURE PERFORMED: Robotic cholecystectomy    SURGEON:    Volodymyr Parish M.D.    ASSISTANT:    CASSIUS Ho.    ANESTHESIOLOGIST:  Aury Jeff M.D.     ANESTHESIA:   General endotracheal anesthesia.    ASA CLASSIFICATION:  IV.    INDICATIONS: The patient is an 84 year-old man with gallstone pancreatitis.  This patient is medically ill with history of CAD and 16 prior cardiac stents, aortic stenosis status post TAVR, CKD stage IIIa, and underwent perioperative restratification by the cardiology team, who determined that he was low to moderate risk for perioperative complications with cholecystectomy.  The risks, benefits discussion was had with the patient and his family, and perioperative risks of morbidity mortality as well as the risk of recurrent pancreatitis were presented.  The patient expressed his understanding of the risks of complications with operative intervention, and wishes to proceed with an inpatient cholecystectomy.  He is taken to the operating room for a robotic, possible open cholecystectomy.    The nature of the surgical procedure warranted additional skilled operative assistance from an Advanced Registered Nurse Practitioner (ARNP). The assistant was present for a substantial portion of the operation and provided assistance to the operating physician throughout the critical aspects of the procedure. The surgical assistant performed the following: operated the camera for the laparoscopic portion of the procedure and performed the skin closure and dressing application.    FINDINGS: Gallbladder with thickening and chronic inflammation, dense adhesions of the omentum and surrounding fibrous tissues down to the level of the cystic triangle.  Top-down approach used to identify the cystic duct, which was ultimately able to be  skeletonized and doubly clipped and divided.    WOUND CLASSIFICATION:  Class III, Contaminated.    SPECIMEN:     Gallbladder.    ESTIMATED BLOOD LOSS:  25 mL.    PROCEDURE: Following informed consent, the patient was properly identified, taken to the operating room and placed in supine position where a general endotracheal anesthesia was administered. Intravenous antibiotics were administered in the correct time interval. The patient voided prior to surgery. A urinary catheter was not placed. Sequential compression devices were employed. A safety retension strap was secured. Active patient warming devices were utilized. The operative site was widely prepped and draped into a sterile field.  A timeout was conducted with the full attention and participation of all operating room personnel.     Local anesthetic was infiltrated in the left upper quadrant and an incision was made to accommodate an 8 mm port.  Entry into the abdomen was gained using an 8 mm port in Opti view fashion with subsequent insufflation.  The patient's oxygen saturation decreased into the high 80s with insufflation, therefore insufflation was relieved.  The anesthesiologist performed a recruitment maneuver, which improved saturation.  The abdomen was slowly reinsufflated to a pressure of 10 mmHg, which the patient tolerated.  The laparoscope was inserted, entry into the abdomen was confirmed, and the abdomen was inspected.  No intraperitoneal injuries were appreciated.     Three additional 8 mm ports were placed after the infiltration of local anesthetic and under direct visualization in the left lower quadrant, inferior and right to the umbilicus, and in the right lower quadrant.  The robot was docked.    The gallbladder was identified and there were noted to be dense adhesions of the omentum to the anterior gallbladder wall.  These were taken down carefully using electrocautery until the dome of the gallbladder was able to be grasped and  retracted.  The gallbladder was unable to be fully retracted over the liver due to additional adhesions towards the fundus.  Careful dissection was performed bluntly to free the gallbladder of the omentum anteriorly and to expose the area of the cystic triangle.  The gallbladder was found to be significantly inflamed and thickened.  The cystic plate traversed medially to laterally rather than in a more oblique angle, and was unable to be retracted directly over the liver edge.  A ProGrasp forcep was used to elevate the gallbladder and dissection was initiated just above the cystic triangle anteriorly along the gallbladder by incising the peritoneum.     The peritoneum was taken down medially and laterally.  Attention was then turned to the cystic triangle.  Indocyanine green had been administered preoperatively, and firefly technology was used to visualize the cystic duct, but the common bile duct was unable to be visualized.  Given this finding, and the abnormal anatomy of the cystic plate, a top-down approach was utilized.  The peritoneum of the gallbladder was incised superiorly and the gallbladder was dissected off the liver bed from its dome towards the fundus.  As the cystic plate surface of the gallbladder was identified, a small posterior branch of an artery traversing directly into the gallbladder was identified, doubly clipped, and divided close the gallbladder.  Dissection was then turned medially, where the cystic artery was skeletonized, clipped, and divided.  Division of these 2 structures allowed for better visualization of the cystic triangle.  During dissection, there was spillage of thick bile, but no overt stones.  Dissection was followed down from the fundus of the gallbladder towards the neck and the cystic duct.  The cystic duct was ultimately able to be skeletonized and 2 proximal and 1 distal clip were placed prior to division of the duct.     Once the gallbladder was free, the liver bed was  inspected for hemostasis, which was achieved.  A specimen retrieval bag was inserted through the left upper quadrant port and the gallbladder was placed within it.  All robotic instruments were then removed.     The specimen bag containing the gallbladder was removed through the left upper quadrant port.  Due to the size of the specimen, dilation of this port site with a peon clamp was required.  An 0 Vicryl with an endoscopic suture passer was used to close the fascial defect at the site.     Insufflation was relieved, and all remaining ports were removed.  Local anesthetic was infiltrated into the area of each incision.  The skin of each incision was approximated using Monocryl in buried simple interrupted and running subcuticular fashion.  Skin adhesive was applied to all wounds.     The patient did have some episodes of tachycardia, bradycardia, hypoxia during the operation which resolved quickly, allowing safe continuation of the operation.  Aside from the aforementioned, there were no apparent complications. All sponge, needle, and instrument counts were correct on two separate occasions. The patient was awakened, extubated, and transferred to  to the post anesthesia care unit (PACU) in satisfactory condition.    ____________________________________________________________________    Volodymyr Parish MD, MS    03/04/25 1:07 PM

## 2025-03-04 NOTE — PROGRESS NOTES
"  DATE: 3/4/2025      Hospital Day 6 gallstone pancreatitis     INTERVAL EVENTS:  No events  Pain improved  NPO for surgery today    PHYSICAL EXAMINATION:  Vital Signs: BP (!) 173/79   Pulse 95   Temp 36.4 °C (97.5 °F) (Temporal)   Resp 20   Ht 1.6 m (5' 2.99\")   Wt 81 kg (178 lb 9.2 oz)   SpO2 95%   CONSTITUTIONAL: Alert, awake, oriented x3.  HEENT: Normocephalic, atraumatic. PERRL. Conjunctivae normal.  NECK: Supple  CARDIOVASCULAR: Normal rate, regular rhythm.  PULMONARY/CHEST: No respiratory distress. Chest wall stable, non-tender, normal excursion.  ABDOMEN: Soft, non-distended, mildly tender to palpation in the right upper quadrant.  MUSCULOSKELETAL: Moving all extremities.  NEUROLOGICAL: CNII-XII grossly intact, no focal deficits.  SKIN: Warm and dry. No abrasions, lacerations.  PSYCHIATRIC: Behavior appropriate for situation.      ASSESSMENT AND PLAN:  This is an 84-year-old male with significant cardiac history presenting with gallstone pancreatitis.    * Gallstone pancreatitis- (present on admission)  Assessment & Plan  Pancreatitis on admission: Remains tender.  From a biliary standpoint, would recommend cholecystectomy during this hospitalization.    Will proceed with robotic, possible open cholecystectomy today  Risks, benefits, alternatives discussed with the patient, who agrees to proceed as indicated  Labs, imaging, vitals reviewed  Indocyanine green preoperatively      Coronary artery disease involving native coronary artery of native heart without angina pectoris- (present on admission)  Assessment & Plan  History of severe coronary artery disease.  Patient states he has 16 stents.  Last echo in 2023 showed LVEF 45%.  Repeat echo performed, patient is low to intermediate risk for perioperative complications per cardiology      __________________________________     Volodymyr Parish M.D.    DD: 3/2/2025  8:59 AM    "

## 2025-03-04 NOTE — PROGRESS NOTES
4 Eyes Skin Assessment Completed by Darrel Whittaker, RN and Aleks Thornton, JOSEPH.    Head WDL  Ears WDL  Nose WDL  Mouth WDL  Neck WDL  Breast/Chest WDL  Shoulder Blades WDL  Spine WDL  (R) Arm/Elbow/Hand WDL  (L) Arm/Elbow/Hand WDL  Abdomen Incision from laparoscopic surgery  Groin WDL  Scrotum/Coccyx/Buttocks WDL  (R) Leg WDL  (L) Leg WDL  (R) Heel/Foot/Toe WDL  (L) Heel/Foot/Toe WDL          Devices In Places Tele Box, Blood Pressure Cuff, Pulse Ox, and Oxy Mask      Interventions In Place Pillows    Possible Skin Injury No    Pictures Uploaded Into Epic N/A  Wound Consult Placed N/A  RN Wound Prevention Protocol Ordered No

## 2025-03-04 NOTE — ANESTHESIA PREPROCEDURE EVALUATION
Case: [id number] Date/Time: 03/04/25 0945    Procedure: CHOLECYSTECTOMY, ROBOT-ASSISTED, USING DA MUKUND XI    Anesthesia type: General    Location: [facility] OR 09 / SURGERY [facility]    Surgeons: Volodymyr Parish M.D.            Relevant Problems   ANESTHESIA   (positive) Obstructive sleep apnea      CARDIAC   (positive) Atherosclerosis of native artery of left lower extremity with intermittent claudication (HCC)   (positive) Atherosclerosis of native coronary artery of native heart with angina pectoris with documented spasm (HCC)   (positive) Atherosclerosis of native coronary artery without angina pectoris   (positive) Coronary artery disease involving native coronary artery of native heart without angina pectoris   (positive) Essential hypertension   (positive) Nodular calcific aortic valve stenosis   (positive) Nonrheumatic mitral valve regurgitation   (positive) Paroxysmal atrial fibrillation (HCC)      GI   (positive) GERD (gastroesophageal reflux disease)         (positive) Renal insufficiency   (positive) Stage 3a chronic kidney disease (CKD)      ENDO   (positive) Other specified hypothyroidism       Physical Exam    Airway   Mallampati: II  TM distance: >3 FB  Neck ROM: full       Cardiovascular   Rhythm: irregular  Rate: normal     Dental - normal exam           Pulmonary - normal exam  Breath sounds clear to auscultation     Abdominal    Neurological                Anesthesia Plan    ASA 3   ASA physical status 3 criteria: CAD (>3 months), MI or angina - history (> 3 months) and Stents (> 3 months)    Plan - general       Airway plan will be ETT    (85yo male presenting for cholecystectomy in the setting of gallstone pancreatitis. Has significant cardiac hx,  CAD s/p multiple stents, ischemic cardiomyopathy with EF of 45 to 50%, AS s/p TAVR, Afib,  HTN, DAVID, GERD, CVA,     Echo: 3/2/25 - Mild global hypokinesis of the LV. LVSF mildly reduced. EF 45-50%. TAVR with mild to mod perivalvular leak.       Discussed in detail his elevated cardiac risk. Full code.)      Induction: intravenous      Pertinent diagnostic labs and testing reviewed    Informed Consent:    Anesthetic plan and risks discussed with patient.    Use of blood products discussed with: patient whom consented to blood products.

## 2025-03-04 NOTE — ANESTHESIA PROCEDURE NOTES
Airway    Date/Time: 3/4/2025 11:05 AM    Performed by: Aury Jeff M.D.  Authorized by: Aury Jeff M.D.    Location:  OR  Urgency:  Elective  Indications for Airway Management:  Anesthesia      Spontaneous Ventilation: absent    Sedation Level:  Deep  Preoxygenated: Yes    Patient Position:  Sniffing  Mask Difficulty Assessment:  1 - vent by mask  Final Airway Type:  Endotracheal airway  Final Endotracheal Airway:  ETT  Cuffed: Yes    Technique Used for Successful ETT Placement:  Direct laryngoscopy  Devices/Methods Used in Placement:  Bougie    Insertion Site:  Oral  Blade Type:  Valente  Laryngoscope Blade/Videolaryngoscope Blade Size:  3  ETT Size (mm):  7.0  Measured from:  Teeth  ETT to Teeth (cm):  21  Placement Verified by: auscultation and capnometry    Cormack-Lehane Classification:  Grade IIb - view of arytenoids or posterior of glottis only  Number of Attempts at Approach:  1  Number of Other Approaches Attempted:  0   First attempt grade 3 view, second attempt with repositioning grade 2b with Mac 4 and bougie.

## 2025-03-04 NOTE — CARE PLAN
The patient is Watcher - Medium risk of patient condition declining or worsening    Shift Goals  Clinical Goals: hemodynhamic stability, NPO post mn  Patient Goals: rest  Family Goals: updates    Progress made toward(s) clinical / shift goals:    Problem: Knowledge Deficit - Standard  Goal: Patient and family/care givers will demonstrate understanding of plan of care, disease process/condition, diagnostic tests and medications  Outcome: Progressing     Problem: Fall Risk  Goal: Patient will remain free from falls  Outcome: Progressing     Problem: Pain - Standard  Goal: Alleviation of pain or a reduction in pain to the patient’s comfort goal  Outcome: Progressing     Problem: Hemodynamics  Goal: Patient's hemodynamics, fluid balance and neurologic status will be stable or improve  Outcome: Progressing     Problem: Respiratory  Goal: Patient will achieve/maintain optimum respiratory ventilation and gas exchange  Outcome: Progressing     Problem: Urinary Elimination  Goal: Establish and maintain regular urinary output  Outcome: Progressing     Problem: Infection - Standard  Goal: Patient will remain free from infection  Outcome: Progressing       Patient is not progressing towards the following goals:

## 2025-03-04 NOTE — ASSESSMENT & PLAN NOTE
3/4:  General surgery to take for cholecystectomy 3/4  Discussed bland low fat diet s/p damien due to recent gallstone pancreatitis  Wean IV fluids  Antiemetics and pain meds as needed

## 2025-03-04 NOTE — NON-PROVIDER
"    This note is intended for the purposes of medical student education and feedback only.   Please refer to the documentation by this patient's assigned medical practitioner for details of care and plans.    DATE: 3/4/2025    Mr. Enrico Cohen is an 85 y/o male with PMHx of CAD x16 stents, Aortic valve stenosis s/p TAVR, HFrEF, CKD3a, HTN, hypothyroidism, Afib on Xarelto admitted on 2/27 for gallstone pancreatitis scheduled for robotic assisted laparoscopic cholecystectomy today.       Past Medical History:   Diagnosis Date    Anticoagulant long-term use     on Plavix for stents    Aortic stenosis, severe     ASCVD (arteriosclerotic cardiovascular disease)     pt has had placement of 8 cardiac stents    BPH (benign prostatic hyperplasia)     CAD (coronary artery disease)     Ho    Elevated PSA     Braxton    Esophageal reflux     Gout flare     painful feet.    History of transcatheter aortic valve replacement (TAVR)     Hyperlipidemia     Hypertension     on Lisinopril  and atenolol    Hypothyroid     PVD (peripheral vascular disease) (HCC)     Sleep apnea     supposed to use CPap but states\" can't tolerate it\"      INTERVAL EVENTS:  There were no significant overnight events. Patient has been NPO since midnight. Vitals have been stable, he has been in afib at a rate of 68-80. WBC 9.1, Hgb 12.9, Plt 234, unremarkable CMP today. PT 13.8 INR 1.06 on 2/27.     Patient continues to have focal point tenderness of the RUQ, that has been unchanged. Denies fevers, chills, palpitations, chest pain. He reports mild SOB, and was placed on 0.5L NC saturating in the 90s.     REVIEW OF SYSTEMS:  As above     PHYSICAL EXAMINATION:  Vital Signs:   Vitals:    03/04/25 0500 03/04/25 0600 03/04/25 0700 03/04/25 0800   BP:   (!) 161/70 (!) 165/67   Pulse: 72 84 74 73   Resp: 14 (!) 21 (!) 26 (!) 24   Temp:       TempSrc:       SpO2: 94% 93% 94% 91%   Weight:       Height:          Physical Exam  Constitutional:       Appearance: " Normal appearance.   HENT:      Head: Normocephalic.      Mouth/Throat:      Pharynx: Oropharynx is clear.   Cardiovascular:      Rate and Rhythm: Normal rate and regular rhythm.   Pulmonary:      Effort: Pulmonary effort is normal.      Breath sounds: Normal breath sounds.   Abdominal:      General: Bowel sounds are normal.      Palpations: Abdomen is soft.      Tenderness: There is abdominal tenderness (focal RUQ).   Musculoskeletal:         General: Swelling (1+ BLE edema) present.   Skin:     General: Skin is warm and dry.      Capillary Refill: Capillary refill takes less than 2 seconds.   Neurological:      General: No focal deficit present.      Mental Status: He is alert.   Psychiatric:         Mood and Affect: Mood normal.         LABORATORY VALUES:  Recent Labs     03/02/25  0907 03/03/25  0353 03/04/25  0546   WBC 9.8 10.4 9.1   RBC 4.03* 4.25* 4.45*   HEMOGLOBIN 11.5* 12.2* 12.9*   HEMATOCRIT 35.3* 37.4* 39.8*   MCV 87.6 88.0 89.4   MCH 28.5 28.7 29.0   MCHC 32.6 32.6 32.4   RDW 45.8 45.2 45.3   PLATELETCT 185 220 234   MPV 12.2 11.8 11.8     Recent Labs     03/02/25  0907 03/03/25  0353 03/04/25  0546   SODIUM 135 138 137   POTASSIUM 3.9 4.3 3.9   CHLORIDE 103 105 105   CO2 21 22 21   GLUCOSE 110* 102* 106*   BUN 18 19 16   CREATININE 1.14 1.15 0.99   CALCIUM 7.6* 8.1* 8.7     Recent Labs     03/01/25  1540 03/02/25  0907 03/03/25  0353   ASTSGOT 125* 67* 51*   ALTSGPT 179* 107* 89*   TBILIRUBIN 2.4* 0.9 0.7   ALKPHOSPHAT 148* 128* 145*   GLOBULIN 3.8* 2.7 2.8           Intake/Output Summary (Last 24 hours) at 3/4/2025 0859  Last data filed at 3/3/2025 1512  Gross per 24 hour   Intake 200 ml   Output 1825 ml   Net -1625 ml        IMAGING:  EC-ECHOCARDIOGRAM COMPLETE W/ CONT   Final Result      CT-CHEST,ABDOMEN WITH   Final Result      1.  Cholelithiasis and mildly distended gallbladder. If there is concern for acute cholecystitis, nuclear medicine HIDA scan may be performed.   2.  Nonspecific mild  biliary dilatation. No definite evidence of choledocholithiasis however correlate with biliary labs. If concern for obstruction, MRCP may be performed.   3.  Chest findings are described on concurrent CT angiogram chest report.      CT-CTA CHEST PULMONARY ARTERY W/ RECONS   Final Result         1.  No evidence of pulmonary embolism.   2.  Probable mild interstitial edema in the lung bases.   3.  Small bilateral pleural effusions with bibasilar atelectasis.   4.  A nonspecific probably cystic anterior mediastinal lesion, has a nonaggressive appearance however comparison with old outside prior exams would be helpful to evaluate for stability.      Fleischner Society pulmonary nodule recommendations:         DX-CHEST-PORTABLE (1 VIEW)   Final Result         Patchy left basilar opacities, atelectasis or infection.      [id number]   Final Result         1. No choledocholithiasis. Mildly dilated CBD.   2. Cholelithiasis. Mild stranding and fluid surrounding the gallbladder, concerning for cholecystitis.          ASSESSMENT AND PLAN:  Mr. Enrico Cohen is an 85 y/o male with PMHx of CAD x16 stents, Aortic valve stenosis s/p TAVR, HFrEF, CKD3a, HTN, hypothyroidism, Afib on Xarelto admitted on 2/27 for gallstone pancreatitis scheduled for robotic assisted laparoscopic cholecystectomy today.  - 2/27/25 CT AP showed gallstone in gallbladder with intra and extra-biliary dilation. Lipase was elevated >3000.   - subsequent MRCP on 2/27/25 showed no evidence of choledocholithiasis, mild CBD dilation, cholelithiasis   - Admitted to medicine and started on Abx and IV fluids; on 3/1/25 developed respiratory difficulty and upgraded to IMCU   - CTA of the chest negative for PE   - Found to be in afib RVR in 130s   - Started on IV lasix, digoxin, and metoprolol; transferred to [facility]   - Echo done on 3/2/25: EF 50-55%  - Per cardiology, patient is a low-intermediate risk for perioperative complication       # Gallstone  Pancreatitis  - NPO since midnight   - Scheduled for robotic assisted lap cholecystectomy today   - Ceftriaxone 2g q24 hours IV   - Flagyl 500mg q12 hours   - Pain controlled  - Zofran 4mg PO q6 hours PRN       #Prophylaxis  - DVT: SCDs  - GI: Omeprazole 40mg QD     Overseeing Licensed Provider:  Volodymyr Parish MD; Eliza Brown, APRN  ____________________________________  Laurie Calle, MS3  Medical Student, [facility]

## 2025-03-04 NOTE — ANESTHESIA TIME REPORT
Anesthesia Start and Stop Event Times       Date Time Event    3/4/2025 1038 Ready for Procedure     1055 Anesthesia Start     1308 Anesthesia Stop          Responsible Staff  03/04/25      Name Role Begin End    Aury Jeff M.D. Anesth 1055 1308          Overtime Reason:  no overtime (within assigned shift)    Comments:

## 2025-03-04 NOTE — ANESTHESIA POSTPROCEDURE EVALUATION
Patient: Enrico Cohen    Procedure Summary       Date: 03/04/25 Room / Location: [facility] 09 / SURGERY [facility]    Anesthesia Start: 1055 Anesthesia Stop: 1308    Procedure: CHOLECYSTECTOMY, ROBOT-ASSISTED, USING DA MUKUND XI (Abdomen) Diagnosis: (GALLSTONE PANCREATITIS, CHOLECYSTITIS)    Surgeons: Volodymyr Parish M.D. Responsible Provider: Aury Jeff M.D.    Anesthesia Type: general ASA Status: 3            Final Anesthesia Type: general  Last vitals  BP   Blood Pressure : (!) 143/88, NIBP: 153/68    Temp   36.4 °C (97.5 °F)    Pulse   82   Resp   20    SpO2   95 %      Anesthesia Post Evaluation    Patient location during evaluation: PACU  Patient participation: complete - patient participated  Level of consciousness: awake  Pain score: 2    Airway patency: patent  Anesthetic complications: no  Cardiovascular status: hemodynamically stable  Respiratory status: acceptable  Hydration status: stable    PONV: none          No notable events documented.     Nurse Pain Score: 1 (NPRS)

## 2025-03-04 NOTE — PROGRESS NOTES
Hospital Medicine Daily Progress Note    Date of Service  3/4/2025    Chief Complaint      Hospital Course  Enrico Cohen is an 84-year-old male with a past medical history of hypothyroidism, HTN, HLD, CAD status post 16 stents, PSA, GERD, aortic stenosis status post TAVR, CKD 3A, BPH that presented with epigastric pain with nausea and vomiting. He went golfing during the day, then came home with worsening pain. On arrival to ED he had one episode of vomiting. No history of similar pain/symptoms.     Initial WBC 15.6, hemoglobin 15.0, , sodium 141, potassium 3.6, bicarb 25, anion gap 15, BUN 21, creatinine 1.5, , ALT 69, alk phos 110, T. bili 1.8, magnesium 1.7, lipase greater than 250, troponin 67>> 48.  Received hydromorphone 1 mg IV x 1, magnesium 2 g IV x 1, nitroglycerin 0.4 mg SL x 1, NS 1 L bolus x 1, Zofran 4 mg IV x 1. CT abdomen pelvis with contrast showed mild intra and extrahepatic biliary dilatation with gallstone present with distention of the gallbladder and mild peripancreatic stranding suggesting pancreatitis. CT PE without PE, otherwise unremarkable.  Chest x-ray without acute abnormality.    Interval Problem Update  3/4/2025: Await surgery today.  Alert.  Minimal abdominal pain.  Discussed bland low fat diet once able to eat.  Family at bedside and given updates.  Transfer to general surgical floor.  Discharge once okay from surgical standpoint and surgery to notify patient when okay to restart Xarelto and plavix.    I have discussed this patient's plan of care and discharge plan at IDT rounds today with Case Management, Nursing, Nursing leadership, and other members of the IDT team.    Consultants/Specialty  cardiology    Code Status  Full Code    Disposition  The patient is not medically cleared for discharge to home or a post-acute facility.  Anticipate discharge to: home with close outpatient follow-up    I have placed the appropriate orders for post-discharge needs.    Review  of Systems  Review of Systems   Constitutional:  Negative for fever and malaise/fatigue.   Respiratory:  Negative for shortness of breath.    Cardiovascular:  Negative for chest pain, palpitations and leg swelling.   Gastrointestinal:  Positive for abdominal pain (minimal). Negative for nausea and vomiting.   Genitourinary:  Negative for dysuria.   Musculoskeletal:  Negative for back pain.   Neurological:  Negative for headaches.   Psychiatric/Behavioral:  The patient is not nervous/anxious.         Physical Exam  Pulse:  [68-84] 84  Resp:  [19-27] 21  BP: (116-197)/(53-78) 165/78  SpO2:  [87 %-94 %] 93 %    Physical Exam  Vitals reviewed.   Constitutional:       Appearance: Normal appearance. He is obese. He is not ill-appearing.   HENT:      Head: Normocephalic.      Nose: Nose normal.      Mouth/Throat:      Mouth: Mucous membranes are moist.   Eyes:      General:         Right eye: No discharge.         Left eye: No discharge.      Extraocular Movements: Extraocular movements intact.      Conjunctiva/sclera: Conjunctivae normal.   Cardiovascular:      Rate and Rhythm: Normal rate. Rhythm irregular.      Pulses: Normal pulses.      Heart sounds: No murmur heard.  Pulmonary:      Effort: No respiratory distress.      Breath sounds: Normal breath sounds.   Abdominal:      General: Bowel sounds are normal. There is no distension.      Palpations: Abdomen is soft.      Tenderness: There is no rebound.   Musculoskeletal:      Cervical back: Neck supple.      Right lower leg: No edema.      Left lower leg: No edema.   Lymphadenopathy:      Cervical: No cervical adenopathy.   Neurological:      General: No focal deficit present.      Mental Status: He is alert and oriented to person, place, and time.      Cranial Nerves: No cranial nerve deficit.   Psychiatric:         Mood and Affect: Mood normal.         Thought Content: Thought content normal.         Fluids    Intake/Output Summary (Last 24 hours) at 3/4/2025  0718  Last data filed at 3/3/2025 1512  Gross per 24 hour   Intake 200 ml   Output 1825 ml   Net -1625 ml        Laboratory  Recent Labs     03/02/25  0907 03/03/25  0353 03/04/25  0546   WBC 9.8 10.4 9.1   RBC 4.03* 4.25* 4.45*   HEMOGLOBIN 11.5* 12.2* 12.9*   HEMATOCRIT 35.3* 37.4* 39.8*   MCV 87.6 88.0 89.4   MCH 28.5 28.7 29.0   MCHC 32.6 32.6 32.4   RDW 45.8 45.2 45.3   PLATELETCT 185 220 234   MPV 12.2 11.8 11.8     Recent Labs     03/02/25  0907 03/03/25  0353 03/04/25  0546   SODIUM 135 138 137   POTASSIUM 3.9 4.3 3.9   CHLORIDE 103 105 105   CO2 21 22 21   GLUCOSE 110* 102* 106*   BUN 18 19 16   CREATININE 1.14 1.15 0.99   CALCIUM 7.6* 8.1* 8.7                   Imaging  EC-ECHOCARDIOGRAM COMPLETE W/ CONT   Final Result      CT-CHEST,ABDOMEN WITH   Final Result      1.  Cholelithiasis and mildly distended gallbladder. If there is concern for acute cholecystitis, nuclear medicine HIDA scan may be performed.   2.  Nonspecific mild biliary dilatation. No definite evidence of choledocholithiasis however correlate with biliary labs. If concern for obstruction, MRCP may be performed.   3.  Chest findings are described on concurrent CT angiogram chest report.      CT-CTA CHEST PULMONARY ARTERY W/ RECONS   Final Result         1.  No evidence of pulmonary embolism.   2.  Probable mild interstitial edema in the lung bases.   3.  Small bilateral pleural effusions with bibasilar atelectasis.   4.  A nonspecific probably cystic anterior mediastinal lesion, has a nonaggressive appearance however comparison with old outside prior exams would be helpful to evaluate for stability.      Fleischner Society pulmonary nodule recommendations:         DX-CHEST-PORTABLE (1 VIEW)   Final Result         Patchy left basilar opacities, atelectasis or infection.      [id number]-H/O   Final Result         1. No choledocholithiasis. Mildly dilated CBD.   2. Cholelithiasis. Mild stranding and fluid surrounding the gallbladder, concerning  for cholecystitis.           Assessment/Plan  * Gallstone pancreatitis- (present on admission)  Assessment & Plan  3/4:  Resolving with likely passed stone.   NPO for surgery  No significant evidence of radiographic pancreatitis as per MRCP  MRCP negative for obstructing lesion but did show dilated bile duct  Positive cholecystitis  with need for cholecystectomy, likely to happen on 3/4  Monitor closely  Avoid volume overload  Medical treatment otherwise for now      Cholecystitis- (present on admission)  Assessment & Plan  3/4:  General surgery to take for cholecystectomy 3/4  Discussed bland low fat diet s/p damien due to recent gallstone pancreatitis  Wean IV fluids  Antiemetics and pain meds as needed      Paroxysmal atrial fibrillation (HCC)- (present on admission)  Assessment & Plan  3/4:  Prior diagnosis  Not in RVR currently  Continue home digoxin and metoprolol  Hold home rivaroxaban presurgically and restart once okay    Benign prostatic hyperplasia- (present on admission)  Assessment & Plan  3/4:  Continue home tamsulosin and finasteride  Watch for urinary retention    Stage 3a chronic kidney disease (CKD)- (present on admission)  Assessment & Plan  3/4:  Baseline 1.3.  3/4: BUN:16, Cr:0.99    Coronary artery disease involving native coronary artery of native heart without angina pectoris- (present on admission)  Assessment & Plan  3/4:  Status post 16 stents  Hold home Plavix and rivaroxaban pending cholecystectomy  Reevaluated by echocardiography, unchanged  Cardiology eval for preoperative evaluation, deemed moderate risk, the patient is excepting the current risk    Obstructive sleep apnea- (present on admission)  Assessment & Plan  3/4  Home CPAP  RT per protocol    History of transcatheter aortic valve replacement (TAVR)- (present on admission)  Assessment & Plan  3/4:  Status post TAVR.    GERD (gastroesophageal reflux disease)- (present on admission)  Assessment & Plan  3/4:  Continue home  omeprazole    Cardiovascular disease- (present on admission)  Assessment & Plan  3/4:  Hx of 16 stents and TAVR  Cardiology evaluation for preoperative risk assessment and optimization  Echocardiogram is unchanged, the patient verbalized a moderate  intervention risk  Optimize medical management. Benazepril, ezetimibe, metoprolol, rosuvastatin    Mixed hyperlipidemia- (present on admission)  Assessment & Plan  3/4:    Continue home Rosuvastatin and Zetia    Essential hypertension- (present on admission)  Assessment & Plan  3/4  Monitor vitals.  Continue home amlodipine-benazepril, metoprolol  As needed labetalol         VTE prophylaxis:   SCDs/TEDs      I have performed a physical exam and reviewed and updated ROS and Plan today (3/4/2025). In review of yesterday's note (3/3/2025), there are no changes except as documented above.

## 2025-03-04 NOTE — OR NURSING
1303 Pt to PACU 14B from OR. Bedside report from anesthesiologist and RN.  Attached to monitoring, VSS, breathing is calm and unlabored, Patient is asleep currently. Pt has a dressing (Dermabond) on abdominal laparoscopic sites x 4 and CDI. Remains on 4 L oxygen via mask, had to increase oxygen upon arrival. Pt is now on 6 L. Received report from anesthesia on arrhythmias during procedure. RN to monitor.     1309 Replaced O2 probe and Oxygen is better see Flowsheets.      1312- Patient states mild pain is present but declines wanting to take any pain medication at this time.     1332- Patient stating he has 5/10 pain, Mediated per MAR. Tolerating sips of water.   RN updated patient wife and was told that surgeon wanted patient to stay in IMCU due to arrhythmias during his procedure. RN will call surgeon to clarify.     1340- Dr. Parish updated on patient status. Surgeon to call Dr. Lam and decide what floor would be best for patient condition. Rn will hold off on transporting patient to [facility].     1356- RN updated patients wife.     1400- /79, HR A-fib in the 80's. Patient states pain is tolerable. Dr. Jeff updated. New orders received. See MAR.     1410- PRN Hydralazine administered for /76.     1427- BP decreased to 154/70 post hydralazine.     1439- RN updated Dr. Lam on patient status. Orders received for patient to transfer to [facility] to watch heart rhythm over night.     1449- Pain increased to 7/10. Subsequent dose given per MAR.    1506- Report given to JOSEPH Rojo.     1511- Out of PACU to room.

## 2025-03-05 ENCOUNTER — APPOINTMENT (OUTPATIENT)
Dept: RADIOLOGY | Facility: MEDICAL CENTER | Age: 85
DRG: 417 | End: 2025-03-05
Attending: INTERNAL MEDICINE
Payer: COMMERCIAL

## 2025-03-05 LAB
ALBUMIN SERPL BCP-MCNC: 3.1 G/DL (ref 3.2–4.9)
ALBUMIN/GLOB SERPL: 1 G/DL
ALP SERPL-CCNC: 144 U/L (ref 30–99)
ALT SERPL-CCNC: 65 U/L (ref 2–50)
ANION GAP SERPL CALC-SCNC: 12 MMOL/L (ref 7–16)
AST SERPL-CCNC: 59 U/L (ref 12–45)
BILIRUB SERPL-MCNC: 0.5 MG/DL (ref 0.1–1.5)
BUN SERPL-MCNC: 17 MG/DL (ref 8–22)
CALCIUM ALBUM COR SERPL-MCNC: 8.9 MG/DL (ref 8.5–10.5)
CALCIUM SERPL-MCNC: 8.2 MG/DL (ref 8.5–10.5)
CHLORIDE SERPL-SCNC: 102 MMOL/L (ref 96–112)
CO2 SERPL-SCNC: 22 MMOL/L (ref 20–33)
CREAT SERPL-MCNC: 1.1 MG/DL (ref 0.5–1.4)
ERYTHROCYTE [DISTWIDTH] IN BLOOD BY AUTOMATED COUNT: 43.7 FL (ref 35.9–50)
GFR SERPLBLD CREATININE-BSD FMLA CKD-EPI: 65 ML/MIN/1.73 M 2
GLOBULIN SER CALC-MCNC: 3 G/DL (ref 1.9–3.5)
GLUCOSE SERPL-MCNC: 100 MG/DL (ref 65–99)
HCT VFR BLD AUTO: 37.5 % (ref 42–52)
HGB BLD-MCNC: 12.4 G/DL (ref 14–18)
MCH RBC QN AUTO: 28.8 PG (ref 27–33)
MCHC RBC AUTO-ENTMCNC: 33.1 G/DL (ref 32.3–36.5)
MCV RBC AUTO: 87.2 FL (ref 81.4–97.8)
PLATELET # BLD AUTO: 264 K/UL (ref 164–446)
PMV BLD AUTO: 11.9 FL (ref 9–12.9)
POTASSIUM SERPL-SCNC: 4.1 MMOL/L (ref 3.6–5.5)
PROCALCITONIN SERPL-MCNC: 1.98 NG/ML
PROT SERPL-MCNC: 6.1 G/DL (ref 6–8.2)
RBC # BLD AUTO: 4.3 M/UL (ref 4.7–6.1)
SODIUM SERPL-SCNC: 136 MMOL/L (ref 135–145)
WBC # BLD AUTO: 11.7 K/UL (ref 4.8–10.8)

## 2025-03-05 PROCEDURE — 700111 HCHG RX REV CODE 636 W/ 250 OVERRIDE (IP): Performed by: HOSPITALIST

## 2025-03-05 PROCEDURE — 700102 HCHG RX REV CODE 250 W/ 637 OVERRIDE(OP): Performed by: HOSPITALIST

## 2025-03-05 PROCEDURE — 700102 HCHG RX REV CODE 250 W/ 637 OVERRIDE(OP): Performed by: STUDENT IN AN ORGANIZED HEALTH CARE EDUCATION/TRAINING PROGRAM

## 2025-03-05 PROCEDURE — G0316 PR PROLONGED IP/OBS E&M EA 15 MIN: HCPCS | Performed by: INTERNAL MEDICINE

## 2025-03-05 PROCEDURE — 700102 HCHG RX REV CODE 250 W/ 637 OVERRIDE(OP): Performed by: INTERNAL MEDICINE

## 2025-03-05 PROCEDURE — 84145 PROCALCITONIN (PCT): CPT

## 2025-03-05 PROCEDURE — 71045 X-RAY EXAM CHEST 1 VIEW: CPT

## 2025-03-05 PROCEDURE — 80053 COMPREHEN METABOLIC PANEL: CPT

## 2025-03-05 PROCEDURE — 99233 SBSQ HOSP IP/OBS HIGH 50: CPT | Mod: 25 | Performed by: INTERNAL MEDICINE

## 2025-03-05 PROCEDURE — A9270 NON-COVERED ITEM OR SERVICE: HCPCS | Performed by: STUDENT IN AN ORGANIZED HEALTH CARE EDUCATION/TRAINING PROGRAM

## 2025-03-05 PROCEDURE — 85027 COMPLETE CBC AUTOMATED: CPT

## 2025-03-05 PROCEDURE — A9270 NON-COVERED ITEM OR SERVICE: HCPCS | Performed by: HOSPITALIST

## 2025-03-05 PROCEDURE — RXMED WILLOW AMBULATORY MEDICATION CHARGE: Performed by: INTERNAL MEDICINE

## 2025-03-05 PROCEDURE — 700111 HCHG RX REV CODE 636 W/ 250 OVERRIDE (IP): Performed by: INTERNAL MEDICINE

## 2025-03-05 PROCEDURE — 770006 HCHG ROOM/CARE - MED/SURG/GYN SEMI*

## 2025-03-05 PROCEDURE — A9270 NON-COVERED ITEM OR SERVICE: HCPCS | Performed by: INTERNAL MEDICINE

## 2025-03-05 PROCEDURE — 700105 HCHG RX REV CODE 258: Performed by: INTERNAL MEDICINE

## 2025-03-05 RX ORDER — POLYETHYLENE GLYCOL 3350 17 G/17G
17 POWDER, FOR SOLUTION ORAL
COMMUNITY
Start: 2025-03-05

## 2025-03-05 RX ORDER — ONDANSETRON 4 MG/1
4 TABLET, ORALLY DISINTEGRATING ORAL EVERY 4 HOURS PRN
Qty: 10 TABLET | Refills: 0 | Status: SHIPPED | OUTPATIENT
Start: 2025-03-05

## 2025-03-05 RX ORDER — LANOLIN ALCOHOL/MO/W.PET/CERES
100 CREAM (GRAM) TOPICAL DAILY
Qty: 30 TABLET | Refills: 0 | Status: SHIPPED | OUTPATIENT
Start: 2025-03-05

## 2025-03-05 RX ORDER — CEFUROXIME AXETIL 500 MG/1
500 TABLET ORAL 2 TIMES DAILY
Qty: 4 TABLET | Refills: 0 | Status: ACTIVE | OUTPATIENT
Start: 2025-03-05 | End: 2025-03-08

## 2025-03-05 RX ORDER — AMOXICILLIN 250 MG
2 CAPSULE ORAL EVERY EVENING
COMMUNITY
Start: 2025-03-05

## 2025-03-05 RX ORDER — METRONIDAZOLE 500 MG/1
500 TABLET ORAL EVERY 12 HOURS
Qty: 4 TABLET | Refills: 0 | Status: ACTIVE | OUTPATIENT
Start: 2025-03-05 | End: 2025-03-08

## 2025-03-05 RX ORDER — FUROSEMIDE 10 MG/ML
20 INJECTION INTRAMUSCULAR; INTRAVENOUS DAILY
Status: DISCONTINUED | OUTPATIENT
Start: 2025-03-05 | End: 2025-03-06 | Stop reason: HOSPADM

## 2025-03-05 RX ORDER — ACETAMINOPHEN 325 MG/1
325 TABLET ORAL EVERY 4 HOURS PRN
COMMUNITY
Start: 2025-03-05

## 2025-03-05 RX ORDER — OXYCODONE HYDROCHLORIDE 5 MG/1
5 TABLET ORAL EVERY 6 HOURS PRN
Qty: 12 TABLET | Refills: 0 | Status: SHIPPED | OUTPATIENT
Start: 2025-03-05 | End: 2025-03-09

## 2025-03-05 RX ADMIN — FUROSEMIDE 20 MG: 10 INJECTION, SOLUTION INTRAVENOUS at 18:00

## 2025-03-05 RX ADMIN — DIGOXIN 62.5 MCG: 0.12 TABLET ORAL at 05:27

## 2025-03-05 RX ADMIN — EZETIMIBE 10 MG: 10 TABLET ORAL at 05:31

## 2025-03-05 RX ADMIN — OXYCODONE HYDROCHLORIDE 5 MG: 5 TABLET ORAL at 09:43

## 2025-03-05 RX ADMIN — OXYCODONE HYDROCHLORIDE 2.5 MG: 5 TABLET ORAL at 18:10

## 2025-03-05 RX ADMIN — OXYCODONE HYDROCHLORIDE 2.5 MG: 5 TABLET ORAL at 05:26

## 2025-03-05 RX ADMIN — CEFTRIAXONE SODIUM 2000 MG: 10 INJECTION, POWDER, FOR SOLUTION INTRAVENOUS at 05:34

## 2025-03-05 RX ADMIN — LEVOTHYROXINE SODIUM 100 MCG: 0.1 TABLET ORAL at 05:30

## 2025-03-05 RX ADMIN — THIAMINE HYDROCHLORIDE 200 MG: 100 INJECTION, SOLUTION INTRAMUSCULAR; INTRAVENOUS at 05:28

## 2025-03-05 RX ADMIN — OXYCODONE HYDROCHLORIDE 5 MG: 5 TABLET ORAL at 14:02

## 2025-03-05 RX ADMIN — METRONIDAZOLE 500 MG: 500 TABLET ORAL at 17:04

## 2025-03-05 RX ADMIN — METRONIDAZOLE 500 MG: 500 TABLET ORAL at 05:31

## 2025-03-05 RX ADMIN — FINASTERIDE 5 MG: 5 TABLET, FILM COATED ORAL at 05:28

## 2025-03-05 RX ADMIN — AMLODIPINE BESYLATE 5 MG: 10 TABLET ORAL at 05:27

## 2025-03-05 RX ADMIN — ROSUVASTATIN CALCIUM 40 MG: 20 TABLET, FILM COATED ORAL at 05:28

## 2025-03-05 RX ADMIN — ENOXAPARIN SODIUM 40 MG: 100 INJECTION SUBCUTANEOUS at 17:03

## 2025-03-05 RX ADMIN — OMEPRAZOLE 40 MG: 20 CAPSULE, DELAYED RELEASE ORAL at 05:27

## 2025-03-05 RX ADMIN — BENAZEPRIL HYDROCHLORIDE 20 MG: 20 TABLET, FILM COATED ORAL at 05:28

## 2025-03-05 RX ADMIN — THIAMINE HYDROCHLORIDE 200 MG: 100 INJECTION, SOLUTION INTRAMUSCULAR; INTRAVENOUS at 17:03

## 2025-03-05 RX ADMIN — TAMSULOSIN HYDROCHLORIDE 0.8 MG: 0.4 CAPSULE ORAL at 05:30

## 2025-03-05 RX ADMIN — METOPROLOL TARTRATE 25 MG: 25 TABLET, FILM COATED ORAL at 17:04

## 2025-03-05 RX ADMIN — METOPROLOL TARTRATE 25 MG: 25 TABLET, FILM COATED ORAL at 05:31

## 2025-03-05 RX ADMIN — SENNOSIDES AND DOCUSATE SODIUM 2 TABLET: 50; 8.6 TABLET ORAL at 17:04

## 2025-03-05 ASSESSMENT — ENCOUNTER SYMPTOMS
HEADACHES: 0
SHORTNESS OF BREATH: 0
BACK PAIN: 0
VOMITING: 0
ABDOMINAL PAIN: 1
NERVOUS/ANXIOUS: 0
NAUSEA: 0
PALPITATIONS: 0
FEVER: 0

## 2025-03-05 ASSESSMENT — PAIN DESCRIPTION - PAIN TYPE
TYPE: SURGICAL PAIN
TYPE: ACUTE PAIN;SURGICAL PAIN
TYPE: ACUTE PAIN
TYPE: SURGICAL PAIN

## 2025-03-05 ASSESSMENT — COGNITIVE AND FUNCTIONAL STATUS - GENERAL
MOBILITY SCORE: 22
MOVING FROM LYING ON BACK TO SITTING ON SIDE OF FLAT BED: A LITTLE
CLIMB 3 TO 5 STEPS WITH RAILING: A LITTLE
DAILY ACTIVITIY SCORE: 24
SUGGESTED CMS G CODE MODIFIER MOBILITY: CJ
SUGGESTED CMS G CODE MODIFIER DAILY ACTIVITY: CH

## 2025-03-05 ASSESSMENT — FIBROSIS 4 INDEX: FIB4 SCORE: 1.76

## 2025-03-05 NOTE — NON-PROVIDER
"    This note is intended for the purposes of medical student education and feedback only.   Please refer to the documentation by this patient's assigned medical practitioner for details of care and plans.    DATE: 3/5/2025    Mr. Enrico Cohen is an 83 y/o male with PMHx of CAD x16 stents, Aortic valve stenosis s/p TAVR, HFrEF, CKD3a, HTN, hypothyroidism, Afib on Xarelto admitted on 2/27 for gallstone pancreatitis s/p robotic assisted laparoscopic cholecystectomy 3/5/25.      Past Medical History:   Diagnosis Date    Anticoagulant long-term use     on Plavix for stents    Aortic stenosis, severe     ASCVD (arteriosclerotic cardiovascular disease)     pt has had placement of 8 cardiac stents    BPH (benign prostatic hyperplasia)     CAD (coronary artery disease)     Ho    Elevated PSA     Jagjit    Esophageal reflux     Gout flare     painful feet.    History of transcatheter aortic valve replacement (TAVR)     Hyperlipidemia     Hypertension     on Lisinopril  and atenolol    Hypothyroid     PVD (peripheral vascular disease) (HCC)     Sleep apnea     supposed to use CPap but states\" can't tolerate it\"      INTERVAL EVENTS:  Patient underwent robotic assisted lap cholecystectomy which was complicated by hemodynamic instability. Patient was put on low-dose norepinephrine for cardiovascular support. During the course of the surgery, patient fluctuated from afib to sinus rhythm, 1 bout of SVT, bradycardia, and hypoxia.     Postoperatively, patient has been stable. He has been on telemetry, with afib rate controlled in the 80-90s. Oxygen demands were 2L overnight, titrated down to 1L saturating in the 90s. Labs done this morning showed a WBC increase from 9.1 to 11.7, likely reactive leukocytosis, Hgb stable at 12.4. CMP showed down trending/stable LFTs. He has been advanced to clear diet, which he has had bites of w/o n/v. He reports that he does have some increased difficulty with complete urination. Patient also " reports tenderness of the R lap port site. Denies fevers, chills, CP, palpitations.     REVIEW OF SYSTEMS:  As above     PHYSICAL EXAMINATION:  Vital Signs:   Vitals:    03/04/25 2345 03/05/25 0355 03/05/25 0400 03/05/25 0711   BP:  (!) 154/76  (!) 143/75   Pulse:  84  80   Resp:  18  17   Temp:  36.8 °C (98.2 °F)  36.6 °C (97.9 °F)   TempSrc:  Temporal  Temporal   SpO2:  91%  91%   Weight: 78.1 kg (172 lb 2.9 oz)  78.1 kg (172 lb 2.9 oz)    Height:          Physical Exam  Constitutional:       Appearance: Normal appearance.   HENT:      Head: Normocephalic.      Mouth/Throat:      Pharynx: Oropharynx is clear.   Cardiovascular:      Rate and Rhythm: Normal rate. Rhythm irregular.   Pulmonary:      Effort: Pulmonary effort is normal.      Breath sounds: Normal breath sounds.   Abdominal:      Tenderness: Tenderness: focal RUQ.      Comments: Distended abdomen, appropriate paraincisional tenderness of the abdomen, no focal tenderness, lap port site incision sites are c/d/I with Dermabond overlying    Musculoskeletal:         General: Swelling (1+ BLE edema) present.   Skin:     General: Skin is warm and dry.      Capillary Refill: Capillary refill takes less than 2 seconds.   Neurological:      General: No focal deficit present.      Mental Status: He is alert.   Psychiatric:         Mood and Affect: Mood normal.         LABORATORY VALUES:  Recent Labs     03/03/25  0353 03/04/25  0546 03/05/25  0257   WBC 10.4 9.1 11.7*   RBC 4.25* 4.45* 4.30*   HEMOGLOBIN 12.2* 12.9* 12.4*   HEMATOCRIT 37.4* 39.8* 37.5*   MCV 88.0 89.4 87.2   MCH 28.7 29.0 28.8   MCHC 32.6 32.4 33.1   RDW 45.2 45.3 43.7   PLATELETCT 220 234 264   MPV 11.8 11.8 11.9     Recent Labs     03/03/25  0353 03/04/25  0546 03/05/25  0257   SODIUM 138 137 136   POTASSIUM 4.3 3.9 4.1   CHLORIDE 105 105 102   CO2 22 21 22   GLUCOSE 102* 106* 100*   BUN 19 16 17   CREATININE 1.15 0.99 1.10   CALCIUM 8.1* 8.7 8.2*     Recent Labs     03/03/25  0350  03/05/25  0257   ASTSGOT 51* 59*   ALTSGPT 89* 65*   TBILIRUBIN 0.7 0.5   ALKPHOSPHAT 145* 144*   GLOBULIN 2.8 3.0           Intake/Output Summary (Last 24 hours) at 3/5/2025 0914  Last data filed at 3/5/2025 0800  Gross per 24 hour   Intake 960 ml   Output --   Net 960 ml        IMAGING:  EC-ECHOCARDIOGRAM COMPLETE W/ CONT   Final Result      CT-CHEST,ABDOMEN WITH   Final Result      1.  Cholelithiasis and mildly distended gallbladder. If there is concern for acute cholecystitis, nuclear medicine HIDA scan may be performed.   2.  Nonspecific mild biliary dilatation. No definite evidence of choledocholithiasis however correlate with biliary labs. If concern for obstruction, MRCP may be performed.   3.  Chest findings are described on concurrent CT angiogram chest report.      CT-CTA CHEST PULMONARY ARTERY W/ RECONS   Final Result         1.  No evidence of pulmonary embolism.   2.  Probable mild interstitial edema in the lung bases.   3.  Small bilateral pleural effusions with bibasilar atelectasis.   4.  A nonspecific probably cystic anterior mediastinal lesion, has a nonaggressive appearance however comparison with old outside prior exams would be helpful to evaluate for stability.      Fleischner Society pulmonary nodule recommendations:         DX-CHEST-PORTABLE (1 VIEW)   Final Result         Patchy left basilar opacities, atelectasis or infection.      [id number]   Final Result         1. No choledocholithiasis. Mildly dilated CBD.   2. Cholelithiasis. Mild stranding and fluid surrounding the gallbladder, concerning for cholecystitis.          ASSESSMENT AND PLAN:  Mr. Enrico Cohen is an 85 y/o male with PMHx of CAD x16 stents, Aortic valve stenosis s/p TAVR, HFrEF, CKD3a, HTN, hypothyroidism, Afib on Xarelto admitted on 2/27 for gallstone pancreatitis s/p robotic assisted laparoscopic cholecystectomy 3/5/25.    - 2/27/25 CT AP showed gallstone in gallbladder with intra and extra-biliary dilation.  Lipase was elevated >3000.   - subsequent MRCP on 2/27/25 showed no evidence of choledocholithiasis, mild CBD dilation, cholelithiasis   - Admitted to medicine and started on Abx and IV fluids; on 3/1/25 developed respiratory difficulty and upgraded to IMCU   - CTA of the chest negative for PE   - Found to be in afib RVR in 130s   - Started on IV lasix, digoxin, and metoprolol; transferred to [facility]   - Echo done on 3/2/25: EF 50-55%  - Per cardiology, patient is a low-intermediate risk for perioperative complication       # Gallstone Pancreatitis, s/p robotic assisted lap cholecystectomy 3/5/25  - May advance diet as tolerated; currently on clears  - Ceftriaxone 2g q24 hours IV   - Flagyl 500mg q12 hours IV  - Reactive leukocytosis: elevation of WBC from 9.1 -> 11.7 likely d/t surgery   - Hgb stable 12.4 today   - CMP showed down trending/stable LFTs  - From surgical standpoint, patient may resume Xarelto and Plavix, as there is no evidence of postoperative bleed.   - Patient may also be discharged from surgical standpoint.       #Prophylaxis  - DVT: Enoxaparin 40mg SQ; may resume Xarelto  - GI: Omeprazole 40mg QD     Overseeing Licensed Provider:  Volodymyr Parish MD; Bing Rivera PA-C   ____________________________________  Laurie Calle, MS3  Medical Student, [facility]

## 2025-03-05 NOTE — CARE PLAN
The patient is Watcher - Medium risk of patient condition declining or worsening    Shift Goals  Clinical Goals: rest/ pain control   Patient Goals: Rest  Family Goals: SHANNON    Progress made toward(s) clinical / shift goals:            Problem: Knowledge Deficit - Standard  Goal: Patient and family/care givers will demonstrate understanding of plan of care, disease process/condition, diagnostic tests and medications  Description: Target End Date:  1-3 days or as soon as patient condition allows    Document in Patient Education    1.  Patient and family/caregiver oriented to unit, equipment, visitation policy and means for communicating concern  2.  Complete/review Learning Assessment  3.  Assess knowledge level of disease process/condition, treatment plan, diagnostic tests and medications  4.  Explain disease process/condition, treatment plan, diagnostic tests and medications  Outcome: Progressing     Problem: Pain - Standard  Goal: Alleviation of pain or a reduction in pain to the patient’s comfort goal  Description: Target End Date:  Prior to discharge or change in level of care    Document on Vitals flowsheet    1.  Document pain using the appropriate pain scale per order or unit policy  2.  Educate and implement non-pharmacologic comfort measures (i.e. relaxation, distraction, massage, cold/heat therapy, etc.)  3.  Pain management medications as ordered  4.  Reassess pain after pain med administration per policy  5.  If opiods administered assess patient's response to pain medication is appropriate per POSS sedation scale  6.  Follow pain management plan developed in collaboration with patient and interdisciplinary team (including palliative care or pain specialists if applicable)  Outcome: Progressing  Note: Discussed pain control and medications.

## 2025-03-05 NOTE — PROGRESS NOTES
"  DATE: 3/5/2025    POD 1 robotic cholecystectomy for gallstone pancreatitis    INTERVAL EVENTS:  Some pain at left lateral port site  Tolerating clears  Hemodynamically stable      PHYSICAL EXAMINATION:  Vital Signs: BP (!) 143/75   Pulse 80   Temp 36.6 °C (97.9 °F) (Temporal)   Resp 17   Ht 1.6 m (5' 2.99\")   Wt 78.1 kg (172 lb 2.9 oz)   SpO2 91%   CONSTITUTIONAL: Alert, awake, oriented x3.  PULMONARY/CHEST: No respiratory distress. Chest wall stable, non-tender, normal excursion.  ABDOMEN: Soft, non-distended, mildly tender to palpation diffusely, as expected in the postoperative period. Surgical wounds clean, dry, intact with skin adhesive in place.  PSYCHIATRIC: Behavior appropriate for situation.      ASSESSMENT AND PLAN:  This is an 84-year-old male with significant cardiac history presenting with gallstone pancreatitis.    * Gallstone pancreatitis- (present on admission)  Assessment & Plan  Pancreatitis on admission: Remains tender.  From a biliary standpoint, would recommend cholecystectomy during this hospitalization.  3/5 robotic cholecystectomy  Recovering appropriately  Advance diet as tolerated  Clear for discharge from a surgical perspective      Coronary artery disease involving native coronary artery of native heart without angina pectoris- (present on admission)  Assessment & Plan  History of severe coronary artery disease.  Patient states he has 16 stents.  Last echo in 2023 showed LVEF 45%.  Repeat echo performed, patient is low to intermediate risk for perioperative complications per cardiology  May restart anticoagulation today    __________________________________     Volodymyr Parish M.D.    DD: 3/2/2025  8:59 AM    "

## 2025-03-05 NOTE — PROGRESS NOTES
This RN spoke with pt's wife Marely who stated that the pt ran out of a few scripts a week prior to admission. She reordered refills via mail in script which takes 7-10 days to be delivered. She was asking if these medications cold be prescribed prior to discharge.Xarelto, lasix, prilosec, crestor, and synthroid

## 2025-03-05 NOTE — CARE PLAN
Problem: Knowledge Deficit - Standard  Goal: Patient and family/care givers will demonstrate understanding of plan of care, disease process/condition, diagnostic tests and medications  Outcome: Progressing     Problem: Fall Risk  Goal: Patient will remain free from falls  Outcome: Progressing     Problem: Pain - Standard  Goal: Alleviation of pain or a reduction in pain to the patient’s comfort goal  Outcome: Progressing   The patient is Stable - Low risk of patient condition declining or worsening    Shift Goals  Clinical Goals: Remain free of falls, no skin breakdown  Patient Goals: Rest  Family Goals: SHANNON    Progress made toward(s) clinical / shift goals:     Pt educated on plan of care, pt verbalizes understanding, reinforcement needed. Pt educated on pain/anxiety scales, alleviating factors, pain/anxiety medications, and side effects, and need for pain/anxiety reassessment, pt pain/anxiety controlled at this time, reinforcement needed. Pt educated on the need to reposition frequently and remain dry to avoid skin breakdown, reinforcement needed, no new skin breakdown during shift. Fall risk education provided to pt, pt educated about the need to call for assistance while attempting to ambulate, reinforcement needed, pt remains free of falls this shift

## 2025-03-05 NOTE — PROGRESS NOTES
Bedside report received from off going RN/tech: Abhilash, assumed care of patient.   Overnight Events: no overnight events. Pt's abdomen slightly distended and firm/ complains of abd pain will medicate per MAR.  A&O x 4  Oxygen: How many liters 2L RA at baseline  SBP Ranged: 140's-170's  Patient on cardiac monitor: Yes AFIB  80's-90's  IVF/IV medications: Not Applicable   Fall Risk Score: moderate fall risk   Fall risk interventions in place: Place yellow fall risk ID band on patient, Provide patient/family education based on risk assessment, Educate patient/family to call staff for assistance when getting out of bed, Place fall precaution signage outside patient door, Place patient in room close to nursing station, Utilize bed/chair fall alarm, Notify charge of high risk for huddle, and Bed alarm connected correctly  Bed type: Regular (Ke Score less than 17 interventions in place)  Bedside sitter: Not Applicable   Isolation: Not applicable

## 2025-03-06 ENCOUNTER — PHARMACY VISIT (OUTPATIENT)
Dept: PHARMACY | Facility: MEDICAL CENTER | Age: 85
End: 2025-03-06
Payer: COMMERCIAL

## 2025-03-06 VITALS
HEART RATE: 65 BPM | TEMPERATURE: 96.7 F | OXYGEN SATURATION: 95 % | HEIGHT: 63 IN | DIASTOLIC BLOOD PRESSURE: 66 MMHG | RESPIRATION RATE: 18 BRPM | BODY MASS INDEX: 30.51 KG/M2 | WEIGHT: 172.18 LBS | SYSTOLIC BLOOD PRESSURE: 134 MMHG

## 2025-03-06 LAB
ALBUMIN SERPL BCP-MCNC: 3.4 G/DL (ref 3.2–4.9)
ALBUMIN/GLOB SERPL: 1.1 G/DL
ALP SERPL-CCNC: 148 U/L (ref 30–99)
ALT SERPL-CCNC: 59 U/L (ref 2–50)
ANION GAP SERPL CALC-SCNC: 12 MMOL/L (ref 7–16)
AST SERPL-CCNC: 48 U/L (ref 12–45)
BILIRUB SERPL-MCNC: 0.5 MG/DL (ref 0.1–1.5)
BUN SERPL-MCNC: 20 MG/DL (ref 8–22)
CALCIUM ALBUM COR SERPL-MCNC: 9.3 MG/DL (ref 8.5–10.5)
CALCIUM SERPL-MCNC: 8.8 MG/DL (ref 8.5–10.5)
CHLORIDE SERPL-SCNC: 100 MMOL/L (ref 96–112)
CO2 SERPL-SCNC: 24 MMOL/L (ref 20–33)
CREAT SERPL-MCNC: 1.13 MG/DL (ref 0.5–1.4)
ERYTHROCYTE [DISTWIDTH] IN BLOOD BY AUTOMATED COUNT: 44.3 FL (ref 35.9–50)
GFR SERPLBLD CREATININE-BSD FMLA CKD-EPI: 63 ML/MIN/1.73 M 2
GLOBULIN SER CALC-MCNC: 3.2 G/DL (ref 1.9–3.5)
GLUCOSE SERPL-MCNC: 112 MG/DL (ref 65–99)
HCT VFR BLD AUTO: 40.5 % (ref 42–52)
HGB BLD-MCNC: 13.1 G/DL (ref 14–18)
MCH RBC QN AUTO: 28.7 PG (ref 27–33)
MCHC RBC AUTO-ENTMCNC: 32.3 G/DL (ref 32.3–36.5)
MCV RBC AUTO: 88.6 FL (ref 81.4–97.8)
PLATELET # BLD AUTO: 295 K/UL (ref 164–446)
PMV BLD AUTO: 11.5 FL (ref 9–12.9)
POTASSIUM SERPL-SCNC: 3.8 MMOL/L (ref 3.6–5.5)
PROT SERPL-MCNC: 6.6 G/DL (ref 6–8.2)
RBC # BLD AUTO: 4.57 M/UL (ref 4.7–6.1)
SODIUM SERPL-SCNC: 136 MMOL/L (ref 135–145)
WBC # BLD AUTO: 14.1 K/UL (ref 4.8–10.8)

## 2025-03-06 PROCEDURE — 80053 COMPREHEN METABOLIC PANEL: CPT

## 2025-03-06 PROCEDURE — A9270 NON-COVERED ITEM OR SERVICE: HCPCS | Performed by: INTERNAL MEDICINE

## 2025-03-06 PROCEDURE — 700102 HCHG RX REV CODE 250 W/ 637 OVERRIDE(OP): Performed by: INTERNAL MEDICINE

## 2025-03-06 PROCEDURE — 700111 HCHG RX REV CODE 636 W/ 250 OVERRIDE (IP): Performed by: INTERNAL MEDICINE

## 2025-03-06 PROCEDURE — 700102 HCHG RX REV CODE 250 W/ 637 OVERRIDE(OP): Performed by: STUDENT IN AN ORGANIZED HEALTH CARE EDUCATION/TRAINING PROGRAM

## 2025-03-06 PROCEDURE — 85027 COMPLETE CBC AUTOMATED: CPT

## 2025-03-06 PROCEDURE — 700102 HCHG RX REV CODE 250 W/ 637 OVERRIDE(OP): Performed by: HOSPITALIST

## 2025-03-06 PROCEDURE — RXMED WILLOW AMBULATORY MEDICATION CHARGE: Performed by: INTERNAL MEDICINE

## 2025-03-06 PROCEDURE — A9270 NON-COVERED ITEM OR SERVICE: HCPCS | Performed by: HOSPITALIST

## 2025-03-06 PROCEDURE — 99239 HOSP IP/OBS DSCHRG MGMT >30: CPT | Performed by: INTERNAL MEDICINE

## 2025-03-06 PROCEDURE — 700105 HCHG RX REV CODE 258: Performed by: INTERNAL MEDICINE

## 2025-03-06 PROCEDURE — A9270 NON-COVERED ITEM OR SERVICE: HCPCS | Performed by: STUDENT IN AN ORGANIZED HEALTH CARE EDUCATION/TRAINING PROGRAM

## 2025-03-06 RX ORDER — FUROSEMIDE 20 MG/1
20 TABLET ORAL DAILY
Qty: 30 TABLET | Refills: 0 | Status: SHIPPED | OUTPATIENT
Start: 2025-03-06

## 2025-03-06 RX ORDER — OMEPRAZOLE 40 MG/1
40 CAPSULE, DELAYED RELEASE ORAL DAILY
Qty: 90 CAPSULE | Refills: 0 | Status: SHIPPED | OUTPATIENT
Start: 2025-03-06

## 2025-03-06 RX ORDER — LEVOTHYROXINE SODIUM 100 UG/1
100 TABLET ORAL DAILY
Qty: 90 TABLET | Refills: 0 | Status: SHIPPED | OUTPATIENT
Start: 2025-03-06

## 2025-03-06 RX ORDER — CLOPIDOGREL BISULFATE 75 MG/1
75 TABLET ORAL DAILY
Status: DISCONTINUED | OUTPATIENT
Start: 2025-03-06 | End: 2025-03-06 | Stop reason: HOSPADM

## 2025-03-06 RX ADMIN — BENAZEPRIL HYDROCHLORIDE 20 MG: 20 TABLET, FILM COATED ORAL at 05:43

## 2025-03-06 RX ADMIN — OMEPRAZOLE 40 MG: 20 CAPSULE, DELAYED RELEASE ORAL at 05:47

## 2025-03-06 RX ADMIN — METRONIDAZOLE 500 MG: 500 TABLET ORAL at 05:46

## 2025-03-06 RX ADMIN — LEVOTHYROXINE SODIUM 100 MCG: 0.1 TABLET ORAL at 05:47

## 2025-03-06 RX ADMIN — ROSUVASTATIN CALCIUM 40 MG: 20 TABLET, FILM COATED ORAL at 05:47

## 2025-03-06 RX ADMIN — FUROSEMIDE 20 MG: 10 INJECTION, SOLUTION INTRAVENOUS at 05:47

## 2025-03-06 RX ADMIN — METOPROLOL TARTRATE 25 MG: 25 TABLET, FILM COATED ORAL at 05:46

## 2025-03-06 RX ADMIN — OXYCODONE HYDROCHLORIDE 5 MG: 5 TABLET ORAL at 04:28

## 2025-03-06 RX ADMIN — FINASTERIDE 5 MG: 5 TABLET, FILM COATED ORAL at 05:44

## 2025-03-06 RX ADMIN — DIGOXIN 62.5 MCG: 0.12 TABLET ORAL at 05:45

## 2025-03-06 RX ADMIN — CEFTRIAXONE SODIUM 2000 MG: 10 INJECTION, POWDER, FOR SOLUTION INTRAVENOUS at 05:43

## 2025-03-06 RX ADMIN — AMLODIPINE BESYLATE 5 MG: 10 TABLET ORAL at 05:44

## 2025-03-06 RX ADMIN — EZETIMIBE 10 MG: 10 TABLET ORAL at 05:47

## 2025-03-06 RX ADMIN — CLOPIDOGREL BISULFATE 75 MG: 75 TABLET ORAL at 14:15

## 2025-03-06 RX ADMIN — TAMSULOSIN HYDROCHLORIDE 0.8 MG: 0.4 CAPSULE ORAL at 05:44

## 2025-03-06 ASSESSMENT — CHA2DS2 SCORE
CHF OR LEFT VENTRICULAR DYSFUNCTION: NO
DIABETES: NO
AGE 65 TO 74: NO
SEX: MALE
CHA2DS2 VASC SCORE: 4
VASCULAR DISEASE: YES
HYPERTENSION: YES
AGE 75 OR GREATER: YES
PRIOR STROKE OR TIA OR THROMBOEMBOLISM: NO

## 2025-03-06 ASSESSMENT — PAIN DESCRIPTION - PAIN TYPE: TYPE: ACUTE PAIN;SURGICAL PAIN

## 2025-03-06 NOTE — DISCHARGE SUMMARY
Discharge Summary    CHIEF COMPLAINT ON ADMISSION  No chief complaint on file.      Reason for Admission  Medical (Pancreatitis)     Admission Date  2/27/2025    CODE STATUS  Full Code    HPI & HOSPITAL COURSE  83 yo man with pAfib, CAD status post 16 stents, aortic stenosis status post TAVR, CKD 3a, HTN, HLD, GERD who presented with abd pain and vomiting. CT AP showed gallstone pancreatitis. Surgery and GI were consulted and while getting MRCP he had rapid response for resp distress. CTA chest was negative for PE. TTE showed normal EF and normal function of TAVR. He was treated with lasix for fluid overload. Cardiology was consulted, felt he is low to intermediate risk for surgery. His breathing improved.  His LFTs improved and likely passed stone, GI did not recommend ERCP. He underwent robotic cholecystectomy with Dr. Parish 3/4. He was given a course of ceftriaxone and flagyl. Post-op he continued to require 2L O2 and is set up with home oxygen.    Therefore, he is discharged in good and stable condition to home with close outpatient follow-up.    The patient met 2-midnight criteria for an inpatient stay at the time of discharge.    Discharge Date  3/6/2025    FOLLOW UP ITEMS POST DISCHARGE  Wean off O2 as tolerated    DISCHARGE DIAGNOSES  Principal Problem:    Gallstone pancreatitis (POA: Yes)  Active Problems:    Essential hypertension (POA: Yes)    Mixed hyperlipidemia (POA: Yes)      Overview: Formatting of this note might be different from the original.      Added automatically from request for surgery [id number]    Cardiovascular disease (POA: Yes)      Overview: ICD-10 transition    GERD (gastroesophageal reflux disease) (POA: Yes)    History of transcatheter aortic valve replacement (TAVR) (POA: Yes)      Overview: Formatting of this note might be different from the original.      PRESENCE OF CORONARY ANGIOPLASTY IMPLANT AND GRAFT    Obstructive sleep apnea (POA: Yes)    Coronary artery disease involving  native coronary artery of native heart without angina pectoris (POA: Yes)      Overview: Overview:       Lifelong Clopidogrel recommended by Cardiology            Formatting of this note might be different from the original.      Lifelong Clopidogrel recommended by Cardiology    Stage 3a chronic kidney disease (CKD) (POA: Yes)    Benign prostatic hyperplasia (POA: Yes)    Paroxysmal atrial fibrillation (HCC) (POA: Yes)    Cholecystitis (POA: Yes)  Resolved Problems:    * No resolved hospital problems. *      FOLLOW UP  Future Appointments   Date Time Provider Department Center   3/17/2025  3:40 PM Kwame Fagan M.D. [facility]   3/25/2025  9:20 AM Kwame Fagan M.D. [facility]     No follow-up provider specified.    MEDICATIONS ON DISCHARGE     Medication List        START taking these medications        Instructions   acetaminophen 325 MG Tabs  Commonly known as: Tylenol   Take 1 Tablet by mouth every four hours as needed for Mild Pain, Moderate Pain or Fever.  Dose: 325 mg     cefUROXime 500 MG Tabs  Commonly known as: Ceftin   Take 1 Tablet by mouth 2 times a day for 2 days.  Dose: 500 mg     metroNIDAZOLE 500 MG Tabs  Commonly known as: Flagyl   Take 1 Tablet by mouth every 12 hours for 2 days.  Dose: 500 mg     ondansetron 4 MG Tbdp  Commonly known as: Zofran ODT   Take 1 Tablet by mouth every four hours as needed for Nausea/Vomiting (give by mouth if IV route is unavailable.).  Dose: 4 mg     oxyCODONE immediate-release 5 MG Tabs  Commonly known as: Roxicodone   Take 1 Tablet by mouth every 6 hours as needed for Severe Pain for up to 3 days.  Dose: 5 mg     polyethylene glycol/lytes Pack  Commonly known as: Miralax   Take 1 Packet by mouth 1 time a day as needed (if no bowel movement in last 2 days).  Dose: 17 g     senna-docusate 8.6-50 MG Tabs  Commonly known as: Pericolace Or Senokot S   Take 2 Tablets by mouth every evening.  Dose: 2 Tablet     thiamine 100 MG tablet  Commonly known  as: Thiamine   Take 1 Tablet by mouth every day.  Dose: 100 mg            CONTINUE taking these medications        Instructions   allopurinol 300 MG Tabs  Commonly known as: Zyloprim   Take 300 mg by mouth every day.  Dose: 300 mg     amlodipine-benazepril 5-20 MG per capsule  Commonly known as: Lotrel   Take 1 Capsule by mouth every day.  Dose: 1 Capsule     clopidogrel 75 MG Tabs  Commonly known as: Plavix   Take 1 Tablet by mouth every day.  Dose: 75 mg     digoxin 125 MCG Tabs  Commonly known as: Lanoxin   Take 0.5 Tablets by mouth every day.  Dose: 62.5 mcg     ezetimibe 10 MG Tabs  Commonly known as: Zetia   Take 10 mg by mouth every day.  Dose: 10 mg     febuxostat 40 MG Tabs  Commonly known as: Uloric   Take 1 Tablet by mouth every day.  Dose: 40 mg     finasteride 5 MG Tabs  Commonly known as: Proscar   Take 5 mg by mouth every day.  Dose: 5 mg     fish oil 1000 MG Caps capsule   Take 1,000 mg by mouth every day.  Dose: 1,000 mg     furosemide 20 MG Tabs  Commonly known as: Lasix   Take 1 Tablet by mouth every day.  Dose: 20 mg     levothyroxine 100 MCG Tabs  Commonly known as: Synthroid   Take 1 Tablet by mouth every day.  Dose: 100 mcg     magnesium oxide 400 MG Tabs tablet  Commonly known as: Mag-Ox   Take 400 mg by mouth every day.  Dose: 400 mg     metoprolol SR 50 MG Tb24  Commonly known as: Toprol XL   Take 50 mg by mouth every day.  Dose: 50 mg     multivitamin Tabs   Take 1 Tab by mouth every day.  Dose: 1 Tablet     nitroGLYCERIN 0.3 MG SL tablet  Commonly known as: Nitrostat   Place 1 Tab under tongue One-time as needed for Chest Pain for up to 1 dose.  Dose: 0.3 mg     omeprazole 40 MG delayed-release capsule  Commonly known as: PriLOSEC   Take 1 Capsule by mouth every day.  Dose: 40 mg     potassium chloride ER 10 MEQ tablet  Commonly known as: Klor-Con   Take 1 Tablet by mouth every day.  Dose: 10 mEq     rosuvastatin 40 MG tablet  Commonly known as: Crestor   Take 40 mg by mouth every  day.  Dose: 40 mg     tamsulosin 0.4 MG capsule  Commonly known as: Flomax   Take 0.4 mg by mouth every day.  Dose: 0.4 mg     Xarelto 20 MG Tabs tablet  Generic drug: rivaroxaban   Take 1 Tablet by mouth every morning.  Dose: 20 mg              Allergies  No Known Allergies    DIET  Orders Placed This Encounter   Procedures    Diet Order Diet: Cardiac     Standing Status:   Standing     Number of Occurrences:   1     Diet::   Cardiac [6]       ACTIVITY  As tolerated.    CONSULTATIONS  GI, surgery, critical care, cardiology    PROCEDURES    DATE OF OPERATION:                    3/4/2025     PREOPERATIVE DIAGNOSIS:         Gallstone pancreatitis      POSTOPERATIVE DIAGNOSIS:      Gallstone pancreatitis, chronic cholecystitis     PROCEDURE PERFORMED:           Robotic cholecystectomy     SURGEON:                             Volodymyr Parish M.D.    DX-CHEST-PORTABLE (1 VIEW)   Final Result      1.  Mild cardiomegaly with trace bilateral pleural effusions.      EC-ECHOCARDIOGRAM COMPLETE W/ CONT   Final Result      CT-CHEST,ABDOMEN WITH   Final Result      1.  Cholelithiasis and mildly distended gallbladder. If there is concern for acute cholecystitis, nuclear medicine HIDA scan may be performed.   2.  Nonspecific mild biliary dilatation. No definite evidence of choledocholithiasis however correlate with biliary labs. If concern for obstruction, MRCP may be performed.   3.  Chest findings are described on concurrent CT angiogram chest report.      CT-CTA CHEST PULMONARY ARTERY W/ RECONS   Final Result         1.  No evidence of pulmonary embolism.   2.  Probable mild interstitial edema in the lung bases.   3.  Small bilateral pleural effusions with bibasilar atelectasis.   4.  A nonspecific probably cystic anterior mediastinal lesion, has a nonaggressive appearance however comparison with old outside prior exams would be helpful to evaluate for stability.      Fleischner Society pulmonary nodule recommendations:          DX-CHEST-PORTABLE (1 VIEW)   Final Result         Patchy left basilar opacities, atelectasis or infection.      [id number]-I/O   Final Result         1. No choledocholithiasis. Mildly dilated CBD.   2. Cholelithiasis. Mild stranding and fluid surrounding the gallbladder, concerning for cholecystitis.            LABORATORY  Lab Results   Component Value Date    SODIUM 136 03/06/2025    POTASSIUM 3.8 03/06/2025    CHLORIDE 100 03/06/2025    CO2 24 03/06/2025    GLUCOSE 112 (H) 03/06/2025    BUN 20 03/06/2025    CREATININE 1.13 03/06/2025    GLOMRATE 55 (L) 10/31/2023        Lab Results   Component Value Date    WBC 14.1 (H) 03/06/2025    HEMOGLOBIN 13.1 (L) 03/06/2025    HEMATOCRIT 40.5 (L) 03/06/2025    PLATELETCT 295 03/06/2025        Total time of the discharge process exceeds 35 minutes.

## 2025-03-06 NOTE — DISCHARGE PLANNING
Care Transition Team Assessment    Information Source  Orientation Level: Oriented X4  Information Given By: Patient  Who is responsible for making decisions for patient? : Patient    Readmission Evaluation  Is this a readmission?: No    Elopement Risk  Legal Hold: No  Ambulatory or Self Mobile in Wheelchair: Yes  Disoriented: No  Psychiatric Symptoms: None  History of Wandering: No  Elopement this Admit: No  Vocalizing Wanting to Leave: No  Displays Behaviors, Body Language Wanting to Leave: No-Not at Risk for Elopement  Elopement Risk: Not at Risk for Elopement    Interdisciplinary Discharge Planning  Lives with - Patient's Self Care Capacity: Significant Other  Patient or legal guardian wants to designate a caregiver: No  Support Systems: Friends / Neighbors, Spouse / Significant Other  Housing / Facility: [address]    Discharge Preparedness  What is your plan after discharge?: Other (comment) (Home)  What are your discharge supports?: Other (comment) (Significant other)  Prior Functional Level: Ambulatory, Independent with Activities of Daily Living, Independent with Medication Management  Difficulity with ADLs: None  Difficulity with IADLs: None    Functional Assesment  Prior Functional Level: Ambulatory, Independent with Activities of Daily Living, Independent with Medication Management    Finances  Financial Barriers to Discharge: No  Prescription Coverage: Yes    Vision / Hearing Impairment  Right Eye Vision: Impaired, Wears Glasses  Left Eye Vision: Impaired, Wears Glasses  Hearing Impairment : No       Domestic Abuse  Possible Abuse/Neglect Reported to:: Not Applicable    Psychological Assessment  History of Substance Abuse: None    Discharge Risks or Barriers  Discharge risks or barriers?: No  Patient risk factors: Readmission, Vulnerable adult    Anticipated Discharge Information  Discharge Disposition: Discharged to home/self care (01)      Case Management Discharge Planning    Admission Date:  2/27/2025  GMLOS: 4.9  ALOS: 7    6-Clicks ADL Score: 24  6-Clicks Mobility Score: 22      Anticipated Discharge Dispo: Discharge Disposition: Discharged to home/self care (01)    DME Needed: Yes    DME Ordered: Yes    Action(s) Taken: Updated Provider/Nurse on Discharge Plan, Choice obtained, and Referral(s) sent    Escalations Completed: None    Medically Clear: Yes    Next Steps: [employer] chosen for Oxygen provider; referral sent.  When O2 arrives he will call his transportation that is his neighbor and it will take an hour to get here.    Barriers to Discharge: None

## 2025-03-06 NOTE — CARE PLAN
Problem: Knowledge Deficit - Standard  Goal: Patient and family/care givers will demonstrate understanding of plan of care, disease process/condition, diagnostic tests and medications  Outcome: Progressing   The patient is Stable - Low risk of patient condition declining or worsening    Shift Goals  Clinical Goals: Discharge  Patient Goals: Discharge  Family Goals: SHANNON    Progress made toward(s) clinical / shift goals:  Patient has discharge order.     Patient is not progressing towards the following goals:

## 2025-03-06 NOTE — DOCUMENTATION QUERY
"                                                                         [facility]                                                                       Query Response Note      PATIENT:               VARUN CISNEROS  ACCT #:                  [id number]  MRN:                     [id number]  :                      1940  ADMIT DATE:       2025 7:05 PM  DISCH DATE:          RESPONDING  PROVIDER #:        [id number]           QUERY TEXT:    Pulmonary Edema is documented in the Medical Record.  Can the type and acuity of pulmonary edema be further specified?    The patient's Clinical Indicators include:  84 year old male admitted for  gallstone pancreatitis.    3/4 Genaro \"Chest x-ray without acute abnormality.\"    3/3 Angelhuber, \"Echocardiogram shows a EF of 50 to 55%, inferior wall appears akinetic, known TAVR appears to be functioning normally\"  \" cardiology has evaluated the patient, echocardiogram is fairly unchanged,\"    3/1  Josh \"continues to have: A-fib with RVR, flash pulmonary edema\"  \"respiratory distress and hypoxia in the setting of positive fluid balance, uncontrolled A-fib with RVR, and with underlying systolic heart failure with EF 45%  Plan: IV Lasix 40 mg once.  Hold IV fluids.\"    3/1 Al/Hinkle \"Possibly pulmonary edema/ARDS? CXR reassuring. Necrotizing pancreatitis with ascites? Repeat labs and scan pending. Holding IV fluids for now.\"    3/1 CT \"Probable mild interstitial edema in the lung bases. 3Small bilateral pleural effusions with bibasilar atelectasis.\"    3/2 ECHO \"Borderline reduced left ventricular systolic function. The left ventricular ejection fraction is visually estimated to be 50-55%.  The basal inferior wall appears akinetic .Diastolic function is difficult to assess with arrhythmia.\"    Risk factors: systolic heart failure, CAD  Treatment: labs, O2, IV Lasix, CT, ECHO    [email or web address]  Jazmine Blake [address] [employer]    Note: If you " agree with a diagnosis listed, please remember to include it in your concurrent daily documentation and onto the Discharge Summary.  Options provided:   -- Acute pulmonary edema-cardiac related -due to acute systolic heart failure   -- Acute pulmonary edema-cardiac related -due to acute on chronic systolic heart failure   -- Acute pulmonary edema- non cardiogenic   -- Acute flash pulmonary edema ruled out after further study   -- Other explanation, (please specify other explanation)      Query created by: Jazmine Blake on 3/3/2025 3:25 PM    RESPONSE TEXT:    Acute pulmonary edema- non cardiogenic          Electronically signed by:  SEN PALOMO DO 3/6/2025 7:08 AM

## 2025-03-06 NOTE — PROGRESS NOTES
"  DATE: 3/6/2025    POD 2 robotic cholecystectomy for gallstone pancreatitis    INTERVAL EVENTS:  Pain improving  Tolerating a regular diet  Xarelto started yesterday, Hb stable      PHYSICAL EXAMINATION:  Vital Signs: /66   Pulse 65   Temp 35.9 °C (96.7 °F) (Temporal)   Resp 18   Ht 1.6 m (5' 2.99\")   Wt 78.1 kg (172 lb 2.9 oz)   SpO2 95%   CONSTITUTIONAL: Alert, awake, oriented x3.  PULMONARY/CHEST: No respiratory distress. Chest wall stable, non-tender, normal excursion.  ABDOMEN: Soft, non-distended, improved but mild persistent tenderness to palpation diffusely, as expected in the postoperative period. Surgical wounds clean, dry, intact with skin adhesive in place.  PSYCHIATRIC: Behavior appropriate for situation.      ASSESSMENT AND PLAN:  This is an 84-year-old male with significant cardiac history presenting with gallstone pancreatitis.    * Gallstone pancreatitis- (present on admission)  Assessment & Plan  Pancreatitis on admission: Remains tender.  From a biliary standpoint, would recommend cholecystectomy during this hospitalization.  3/5 robotic cholecystectomy  Recovering appropriately  Advance diet as tolerated  Clear for discharge from a surgical perspective      Coronary artery disease involving native coronary artery of native heart without angina pectoris- (present on admission)  Assessment & Plan  History of severe coronary artery disease.  Patient states he has 16 stents.  Last echo in 2023 showed LVEF 45%.  Repeat echo performed, patient is low to intermediate risk for perioperative complications per cardiology  3/5 Xarelto started  Hb stable    __________________________________     Volodymyr Parish M.D.    DD: 3/2/2025  8:59 AM    "

## 2025-03-06 NOTE — PROGRESS NOTES
[facility] Medicine Daily Progress Note    Date of Service  3/5/2025    Chief Complaint      Hospital Course  Enrico Cohen is an 84-year-old male with a past medical history of hypothyroidism, HTN, HLD, CAD status post 16 stents, PSA, GERD, aortic stenosis status post TAVR, CKD 3A, BPH that presented with epigastric pain with nausea and vomiting. He went golfing during the day, then came home with worsening pain. On arrival to ED he had one episode of vomiting. No history of similar pain/symptoms.     Initial WBC 15.6, hemoglobin 15.0, , sodium 141, potassium 3.6, bicarb 25, anion gap 15, BUN 21, creatinine 1.5, , ALT 69, alk phos 110, T. bili 1.8, magnesium 1.7, lipase greater than 250, troponin 67>> 48.  Received hydromorphone 1 mg IV x 1, magnesium 2 g IV x 1, nitroglycerin 0.4 mg SL x 1, NS 1 L bolus x 1, Zofran 4 mg IV x 1. CT abdomen pelvis with contrast showed mild intra and extrahepatic biliary dilatation with gallstone present with distention of the gallbladder and mild peripancreatic stranding suggesting pancreatitis. CT PE without PE, otherwise unremarkable.  Chest x-ray without acute abnormality.    Interval Problem Update  Patient seen and examine at bedside    Managing gallstone pancreatitis, MRCP negative. She has a history of CAD status post 16 stents, aortic stenosis status post TAVR; Cardiology cleared him for Surgery (moderate risk). S/p robotic cholecystectomy by Dr. Parish, Surgery 3/4. Per Dr Parish, okay to restart anticoagulation TODAY if no signs of bleeding. Former  smoker.    Surgery cleared. However he has posoperative hypoxia. No PE on CTA Chest. Rpeat CXR PENDING. Will need O2. He still has postoperative pain but tolerated diet and moved bowels. Plan to discharge TOMORROW when better and has O2. CXR come back with pleural eff and cardimegaly will start low dose Laisx. Monitor K+, renal function and blood pressures.    I reviewed the chart along with vitals, labs, imaging,  test (both pending and resulted) and recommendations from specialists and interdisciplinary team.  I have discussed this patient's plan of care and discharge plan at IDT rounds today with Case Management, Nursing, Nursing leadership, and other members of the IDT team.    Consultants/Specialty  cardiology    Code Status  Full Code    Disposition  Medically Cleared  I have placed the appropriate orders for post-discharge needs.    Review of Systems  Review of Systems   Constitutional:  Negative for fever and malaise/fatigue.   Respiratory:  Negative for shortness of breath.    Cardiovascular:  Negative for chest pain, palpitations and leg swelling.   Gastrointestinal:  Positive for abdominal pain (minimal). Negative for nausea and vomiting.   Genitourinary:  Negative for dysuria.   Musculoskeletal:  Negative for back pain.   Neurological:  Negative for headaches.   Psychiatric/Behavioral:  The patient is not nervous/anxious.         Physical Exam  Temp:  [36.6 °C (97.8 °F)-37.1 °C (98.8 °F)] 36.7 °C (98.1 °F)  Pulse:  [72-98] 80  Resp:  [17-18] 18  BP: (136-168)/(69-86) 136/86  SpO2:  [89 %-94 %] 91 %    Physical Exam  Vitals reviewed.   Constitutional:       Appearance: Normal appearance. He is obese. He is not ill-appearing.   HENT:      Head: Normocephalic.      Nose: Nose normal.      Mouth/Throat:      Mouth: Mucous membranes are moist.   Eyes:      General:         Right eye: No discharge.         Left eye: No discharge.      Extraocular Movements: Extraocular movements intact.      Conjunctiva/sclera: Conjunctivae normal.   Cardiovascular:      Rate and Rhythm: Normal rate. Rhythm irregular.      Pulses: Normal pulses.      Heart sounds: No murmur heard.  Pulmonary:      Effort: No respiratory distress.      Breath sounds: Normal breath sounds.   Abdominal:      General: Bowel sounds are normal. There is no distension.      Palpations: Abdomen is soft.      Tenderness: There is no rebound.   Musculoskeletal:       Cervical back: Neck supple.      Right lower leg: No edema.      Left lower leg: No edema.   Lymphadenopathy:      Cervical: No cervical adenopathy.   Neurological:      General: No focal deficit present.      Mental Status: He is alert and oriented to person, place, and time.      Cranial Nerves: No cranial nerve deficit.   Psychiatric:         Mood and Affect: Mood normal.         Thought Content: Thought content normal.         Fluids    Intake/Output Summary (Last 24 hours) at 3/5/2025 1724  Last data filed at 3/5/2025 0800  Gross per 24 hour   Intake 360 ml   Output --   Net 360 ml        Laboratory  Recent Labs     03/03/25  0353 03/04/25  0546 03/05/25  0257   WBC 10.4 9.1 11.7*   RBC 4.25* 4.45* 4.30*   HEMOGLOBIN 12.2* 12.9* 12.4*   HEMATOCRIT 37.4* 39.8* 37.5*   MCV 88.0 89.4 87.2   MCH 28.7 29.0 28.8   MCHC 32.6 32.4 33.1   RDW 45.2 45.3 43.7   PLATELETCT 220 234 264   MPV 11.8 11.8 11.9     Recent Labs     03/03/25  0353 03/04/25  0546 03/05/25  0257   SODIUM 138 137 136   POTASSIUM 4.3 3.9 4.1   CHLORIDE 105 105 102   CO2 22 21 22   GLUCOSE 102* 106* 100*   BUN 19 16 17   CREATININE 1.15 0.99 1.10   CALCIUM 8.1* 8.7 8.2*                   Imaging  DX-CHEST-PORTABLE (1 VIEW)   Final Result      1.  Mild cardiomegaly with trace bilateral pleural effusions.      EC-ECHOCARDIOGRAM COMPLETE W/ CONT   Final Result      CT-CHEST,ABDOMEN WITH   Final Result      1.  Cholelithiasis and mildly distended gallbladder. If there is concern for acute cholecystitis, nuclear medicine HIDA scan may be performed.   2.  Nonspecific mild biliary dilatation. No definite evidence of choledocholithiasis however correlate with biliary labs. If concern for obstruction, MRCP may be performed.   3.  Chest findings are described on concurrent CT angiogram chest report.      CT-CTA CHEST PULMONARY ARTERY W/ RECONS   Final Result         1.  No evidence of pulmonary embolism.   2.  Probable mild interstitial edema in the lung  bases.   3.  Small bilateral pleural effusions with bibasilar atelectasis.   4.  A nonspecific probably cystic anterior mediastinal lesion, has a nonaggressive appearance however comparison with old outside prior exams would be helpful to evaluate for stability.      Fleischner Society pulmonary nodule recommendations:         DX-CHEST-PORTABLE (1 VIEW)   Final Result         Patchy left basilar opacities, atelectasis or infection.      AB-[id number]-B/O   Final Result         1. No choledocholithiasis. Mildly dilated CBD.   2. Cholelithiasis. Mild stranding and fluid surrounding the gallbladder, concerning for cholecystitis.           Assessment/Plan  * Gallstone pancreatitis- (present on admission)  Assessment & Plan  3/4:  Resolving with likely passed stone.   NPO for surgery  No significant evidence of radiographic pancreatitis as per MRCP  MRCP negative for obstructing lesion but did show dilated bile duct  Positive cholecystitis  with need for cholecystectomy, likely to happen on 3/4  Monitor closely  Avoid volume overload  Medical treatment otherwise for now      Cholecystitis- (present on admission)  Assessment & Plan  3/4:  General surgery to take for cholecystectomy 3/4  Discussed bland low fat diet s/p damien due to recent gallstone pancreatitis  Wean IV fluids  Antiemetics and pain meds as needed      Paroxysmal atrial fibrillation (HCC)- (present on admission)  Assessment & Plan  3/4:  Prior diagnosis  Not in RVR currently  Continue home digoxin and metoprolol  Hold home rivaroxaban presurgically and restart once okay    Benign prostatic hyperplasia- (present on admission)  Assessment & Plan  3/4:  Continue home tamsulosin and finasteride  Watch for urinary retention    Stage 3a chronic kidney disease (CKD)- (present on admission)  Assessment & Plan  3/4:  Baseline 1.3.  3/4: BUN:16, Cr:0.99  Recent Labs     03/03/25  0353 03/04/25  0546 03/05/25  0257   SODIUM 138 137 136   POTASSIUM 4.3 3.9 4.1   CHLORIDE  105 105 102   CO2 22 21 22   GLUCOSE 102* 106* 100*   BUN 19 16 17   CREATININE 1.15 0.99 1.10         Coronary artery disease involving native coronary artery of native heart without angina pectoris- (present on admission)  Assessment & Plan  3/4:  Status post 16 stents  Hold home Plavix and rivaroxaban pending cholecystectomy  Reevaluated by echocardiography, unchanged  Cardiology eval for preoperative evaluation, deemed moderate risk, the patient is excepting the current risk    Obstructive sleep apnea- (present on admission)  Assessment & Plan  3/4  Home CPAP  RT per protocol    History of transcatheter aortic valve replacement (TAVR)- (present on admission)  Assessment & Plan  3/4:  Status post TAVR.    GERD (gastroesophageal reflux disease)- (present on admission)  Assessment & Plan  3/4:  Continue home omeprazole    Cardiovascular disease- (present on admission)  Assessment & Plan  3/4:  Hx of 16 stents and TAVR  Cardiology evaluation for preoperative risk assessment and optimization  Echocardiogram is unchanged, the patient verbalized a moderate  intervention risk  Optimize medical management. Benazepril, ezetimibe, metoprolol, rosuvastatin    Mixed hyperlipidemia- (present on admission)  Assessment & Plan  3/4:    Continue home Rosuvastatin and Zetia    Essential hypertension- (present on admission)  Assessment & Plan  3/4  Monitor vitals.  Continue home amlodipine-benazepril, metoprolol  As needed labetalol  Vitals:    03/05/25 1519   BP: 136/86   Pulse: 80   Resp: 18   Temp: 36.7 °C (98.1 °F)   SpO2: 91%              VTE prophylaxis: VTE Selection  Lovenox ppx  I have performed a physical exam and reviewed and updated ROS and Plan today (3/5/2025). In review of yesterday's note (3/4/2025), there are no changes except as documented above.    Patient is has a high medical complexity, complex decision making and is at high risk for complication, morbidity, and mortality, thus requiring the highest  level of my preparedness for sudden, emergent intervention. Medical decision making is therefore complex. I provided  services, which included ordering labs and/or imaging, and discussing the case with various consultants.medication orders, frequent reevaluations of the patient's condition and response to treatment. Time was also devoted to counseling and coordinating care including review of records, pertinent lab data and studies, as well as discussing diagnostic evaluation and work up, planned therapeutic interventions and future disposition of care. Where indicated, the assessment and plan reflect discussion of patient with consultants, other healthcare providers, family members, and additional research needed to obtain further information in formulating the plan of care for Enrico Cohen. Total time spent was 65 minutes.

## 2025-03-06 NOTE — PROGRESS NOTES
"Assumed care of patient at 1900 from day RN. Patient is A&O x 4. Bed locked in the lowest position, 2 side rails up, call light is within reach, belongings at bedside. Pt reports pain level of 0 /10. Mobility stand by. Oxygen 2 L. Explained plan of care and safety precautions to pt, pt has no questions at this time. Hourly rounding is in place. BP (!) 148/76 Comment: RN Notified  Pulse 75   Temp 36.6 °C (97.9 °F) (Temporal)   Resp 15   Ht 1.6 m (5' 2.99\")   Wt 78.1 kg (172 lb 2.9 oz)   SpO2 93%   BMI 30.51 kg/m²   "

## 2025-03-06 NOTE — DISCHARGE PLANNING
[facility] for D[facility] scanned into media.    Referral sent to [facility].    0938- Spoke To: Cathy [phone]  Agency/Facility Name: [facility]  Plan or Request: Referral to go into review once received.    4124- Spoke To: Phyllis  Agency/Facility Name: [facility]  Plan or Request: Approved, 02 delivered to bedside.

## 2025-03-06 NOTE — NON-PROVIDER
"    This note is intended for the purposes of medical student education and feedback only.   Please refer to the documentation by this patient's assigned medical practitioner for details of care and plans.    DATE: 3/6/2025    Mr. Enrico Cohen is an 83 y/o male with PMHx of CAD x16 stents, Aortic valve stenosis s/p TAVR, HFrEF, CKD3a, HTN, hypothyroidism, Afib on Xarelto admitted on 2/27 for gallstone pancreatitis s/p robotic assisted laparoscopic cholecystectomy 3/5/25.      Past Medical History:   Diagnosis Date    Anticoagulant long-term use     on Plavix for stents    Aortic stenosis, severe     ASCVD (arteriosclerotic cardiovascular disease)     pt has had placement of 8 cardiac stents    BPH (benign prostatic hyperplasia)     CAD (coronary artery disease)     Ho    Elevated PSA     Jagjit    Esophageal reflux     Gout flare     painful feet.    History of transcatheter aortic valve replacement (TAVR)     Hyperlipidemia     Hypertension     on Lisinopril  and atenolol    Hypothyroid     PVD (peripheral vascular disease) (HCC)     Sleep apnea     supposed to use CPap but states\" can't tolerate it\"      INTERVAL EVENTS:  Patient was transferred to med/surg from [facility], taken off of telemetry as he has been in rate controlled atrial fibrillation. Patient had a chest XR done d/t persisting oxygen needs at 2L NC postoperatively, which revealed pleural effusion and cardiomegaly. He was started on Lasix. CTA negative for PE.     Patient states that he still has some tenderness of the left abdomen, but denies increased pain. He had a loose bowel movement this morning and passing flatus. He has been ambulating in his room. Tolerated regular cardiac diet well yesterday.     REVIEW OF SYSTEMS:  As above     PHYSICAL EXAMINATION:  Vital Signs:   Vitals:    03/05/25 2011 03/06/25 0428 03/06/25 0503 03/06/25 0715   BP: (!) 148/76  138/79 134/66   Pulse: 75  88 65   Resp: 15 18 18 18   Temp: 36.6 °C (97.9 °F)  36.4 °C (97.6 " °F) 35.9 °C (96.7 °F)   TempSrc: Temporal  Temporal Temporal   SpO2: 93%  93% 95%   Weight:       Height:          Physical Exam  Constitutional:       Appearance: Normal appearance.   HENT:      Head: Normocephalic.      Mouth/Throat:      Pharynx: Oropharynx is clear.   Cardiovascular:      Rate and Rhythm: Normal rate. Rhythm irregular.   Pulmonary:      Effort: Pulmonary effort is normal.      Breath sounds: Normal breath sounds.   Abdominal:      Tenderness: Tenderness: focal RUQ.      Comments: Distended abdomen improved from yesterday, appropriate paraincisional tenderness of the abdomen, no focal tenderness, lap port site incision sites are c/d/I with Dermabond overlying    Musculoskeletal:         General: Swelling (1+ BLE edema) present.   Skin:     General: Skin is warm and dry.      Capillary Refill: Capillary refill takes less than 2 seconds.   Neurological:      General: No focal deficit present.      Mental Status: He is alert.   Psychiatric:         Mood and Affect: Mood normal.         LABORATORY VALUES:  Recent Labs     03/04/25  0546 03/05/25 0257 03/06/25 0426   WBC 9.1 11.7* 14.1*   RBC 4.45* 4.30* 4.57*   HEMOGLOBIN 12.9* 12.4* 13.1*   HEMATOCRIT 39.8* 37.5* 40.5*   MCV 89.4 87.2 88.6   MCH 29.0 28.8 28.7   MCHC 32.4 33.1 32.3   RDW 45.3 43.7 44.3   PLATELETCT 234 264 295   MPV 11.8 11.9 11.5     Recent Labs     03/04/25  0546 03/05/25  0257 03/06/25  0426   SODIUM 137 136 136   POTASSIUM 3.9 4.1 3.8   CHLORIDE 105 102 100   CO2 21 22 24   GLUCOSE 106* 100* 112*   BUN 16 17 20   CREATININE 0.99 1.10 1.13   CALCIUM 8.7 8.2* 8.8     Recent Labs     03/05/25 0257 03/06/25  0426   ASTSGOT 59* 48*   ALTSGPT 65* 59*   TBILIRUBIN 0.5 0.5   ALKPHOSPHAT 144* 148*   GLOBULIN 3.0 3.2           Intake/Output Summary (Last 24 hours) at 3/6/2025 0855  Last data filed at 3/5/2025 2000  Gross per 24 hour   Intake 120 ml   Output --   Net 120 ml        IMAGING:  DX-CHEST-PORTABLE (1 VIEW)   Final Result       1.  Mild cardiomegaly with trace bilateral pleural effusions.      EC-ECHOCARDIOGRAM COMPLETE W/ CONT   Final Result      CT-CHEST,ABDOMEN WITH   Final Result      1.  Cholelithiasis and mildly distended gallbladder. If there is concern for acute cholecystitis, nuclear medicine HIDA scan may be performed.   2.  Nonspecific mild biliary dilatation. No definite evidence of choledocholithiasis however correlate with biliary labs. If concern for obstruction, MRCP may be performed.   3.  Chest findings are described on concurrent CT angiogram chest report.      CT-CTA CHEST PULMONARY ARTERY W/ RECONS   Final Result         1.  No evidence of pulmonary embolism.   2.  Probable mild interstitial edema in the lung bases.   3.  Small bilateral pleural effusions with bibasilar atelectasis.   4.  A nonspecific probably cystic anterior mediastinal lesion, has a nonaggressive appearance however comparison with old outside prior exams would be helpful to evaluate for stability.      Fleischner Society pulmonary nodule recommendations:         DX-CHEST-PORTABLE (1 VIEW)   Final Result         Patchy left basilar opacities, atelectasis or infection.      [id number]   Final Result         1. No choledocholithiasis. Mildly dilated CBD.   2. Cholelithiasis. Mild stranding and fluid surrounding the gallbladder, concerning for cholecystitis.          ASSESSMENT AND PLAN:  Mr. Enrico Cohen is an 83 y/o male with PMHx of CAD x16 stents, Aortic valve stenosis s/p TAVR, HFrEF, CKD3a, HTN, hypothyroidism, Afib on Xarelto admitted on 2/27 for gallstone pancreatitis s/p robotic assisted laparoscopic cholecystectomy 3/5/25.    - 2/27/25 CT AP showed gallstone in gallbladder with intra and extra-biliary dilation. Lipase was elevated >3000.   - subsequent MRCP on 2/27/25 showed no evidence of choledocholithiasis, mild CBD dilation, cholelithiasis   - Admitted to medicine and started on Abx and IV fluids; on 3/1/25 developed respiratory  difficulty and upgraded to IMCU   - CTA of the chest negative for PE   - Found to be in afib RVR in 130s   - Started on IV lasix, digoxin, and metoprolol; transferred to [facility] [place] [facility]   - Echo done on 3/2/25: EF 50-55%  - Per cardiology, patient is a low-intermediate risk for perioperative complication   - Continues to have hypoxia postoperatively; Chest XR revealed pleural effusion. Started on lasix by medicine.       # Gallstone Pancreatitis, s/p robotic assisted lap cholecystectomy 3/5/25  - Tolerating full cardiac diet   - had a bowel movement this AM, passing flatus   - Ceftriaxone 2g q24 hours IV   - Flagyl 500mg q12 hours IV  - Reactive leukocytosis: elevation of WBC from 9.1 -> 11.7--> 14.1 likely d/t surgery   - Hgb stable 13.1 today   - CMP showed down trending/stable LFTs  - From surgical standpoint, patient may resume Xarelto and Plavix, as there is no evidence of postoperative bleed.   - Patient is stable for discharge from a surgical perspective.   - Follow up 2 weeks outpatient ACS clinic   - Do not lift any objects greater than 15 lbs for at least 2 weeks     Surgery signing off.       #Prophylaxis  - DVT: Xarelto   - GI: Omeprazole 40mg QD     Overseeing Licensed Provider:  Volodymyr Parish MD; Bing Rivera PA-C   ____________________________________  Laurie Calle, MS3  Medical Student, [facility]

## 2025-03-06 NOTE — PROGRESS NOTES
Rec'd report from JOSEPH Hurst @ [id number]. Pt arrived to unit with transport via wheelchair. Assumed pt care. Pt AA&Ox4. Pt on 2L NC and vss. Pt complains of pain 5/10 to abdomen. Will medicate per MAR PRN orders. All needs met. Bed locked, bed in lowest position, call light and personal belongings within reach and treaded socks in place for safety. Hourly rounding in place.

## 2025-03-06 NOTE — CARE PLAN
The patient is Stable - Low risk of patient condition declining or worsening    Shift Goals  Clinical Goals: monitor abd pain  Patient Goals: advance diet  Family Goals: SHANNON    Progress made toward(s) clinical / shift goals:  Pt medicated per MAR PRN orders for pain 7/10. Upon reassessment, pt verbalized pain 5/10 and and able to rest comfortably.     Problem: Fall Risk  Goal: Patient will remain free from falls  Outcome: Progressing     Problem: Pain - Standard  Goal: Alleviation of pain or a reduction in pain to the patient’s comfort goal  Outcome: Progressing     Problem: Hemodynamics  Goal: Patient's hemodynamics, fluid balance and neurologic status will be stable or improve  Outcome: Progressing       Patient is not progressing towards the following goals:

## 2025-03-06 NOTE — FACE TO FACE
"Face to Face Note  -  Durable Medical Equipment    Gaurav Parish M.D. - NPI: [id number]  I certify that this patient is under my care and that they had a durable medical equipment(DME)face to face encounter by myself that meets the physician DME face-to-face encounter requirements with this patient on:    Date of encounter:   Patient:                    MRN:                       YOB: 2027  Enrico Cohen  [id number]  1940     The encounter with the patient was in whole, or in part, for the following medical condition, which is the primary reason for durable medical equipment:  Cardiac Disease    I certify that, based on my findings, the following durable medical equipment is medically necessary:    Oxygen   HOME O2 Saturation Measurements:(Values must be present for Home Oxygen orders)  Room air sat at rest: 89  Room air sat with amb: 81  With liters of O2: 1, O2 sat at rest with O2: 91  With Liters of O2: 2, O2 sat with amb with O2 : 94  Is the patient mobile?: Yes  If patient feels more short of breath, they can go up to 6 liters per minute and contact healthcare provider.    Supporting Symptoms: The patient requires supplemental oxygen, as the following interventions have been tried with limited or no improvement: \"Ambulation with oximetry and \"Incentive spirometry.    My Clinical findings support the need for the above equipment due to:  Hypoxia  "

## 2025-03-06 NOTE — DISCHARGE INSTRUCTIONS
Discharge Instructions per Gaurav Parish M.D.    85 yo man with pAfib, CAD status post 16 stents, aortic stenosis status post TAVR, CKD 3a, HTN, HLD, GERD who presented with abd pain and vomiting. CT AP showed gallstone pancreatitis. Surgery and GI were consulted and while getting MRCP he had rapid response for resp distress. CTA chest was negative for PE. TTE showed normal EF and normal function of TAVR. He was treated with lasix for fluid overload. Cardiology was consulted, felt he is low to intermediate risk for surgery. His breathing improved.  His LFTs improved and likely passed stone, GI did not recommend ERCP. He underwent robotic cholecystectomy with Dr. Parish 3/4. He was given a course of ceftriaxone and flagyl. Post-op he continued to require 2L O2 and is set up with home oxygen.

## 2025-03-06 NOTE — CARE PLAN
The patient is Stable - Low risk of patient condition declining or worsening    Shift Goals  Clinical Goals: pt will report pain of less than 3/10 and remain free from falls this shift  Patient Goals: good night sleep  Family Goals: SHANNON    Progress made toward(s) clinical / shift goals:  pt remained free from falls and reported pain 0/10 this shift.     Patient is not progressing towards the following goals:

## 2025-03-06 NOTE — PROGRESS NOTES
4 Eyes Skin Assessment Completed by Roseline RN and JOSEPH Guzman.    Head WDL  Ears WDL  Nose WDL  Mouth WDL  Neck WDL  Breast/Chest WDL  Shoulder Blades WDL  Spine WDL  (R) Arm/Elbow/Hand WDL  (L) Arm/Elbow/Hand WDL  Abdomen Incision  Groin WDL  Scrotum/Coccyx/Buttocks WDL  (R) Leg WDL, scar on knee  (L) Leg WDL  (R) Heel/Foot/Toe WDL  (L) Heel/Foot/Toe WDL          Devices In Places SCD's and Nasal Cannula      Interventions In Place NC W/Ear Foams    Possible Skin Injury No    Pictures Uploaded Into Epic Yes  Wound Consult Placed N/A  RN Wound Prevention Protocol Ordered

## 2025-03-06 NOTE — PROGRESS NOTES
Enrico Cohen has been provided discharge instructions, to include follow up care, home medications, and activity/diet reviewed. Copy of discharge instructions in patient chart, signed and reviewed. Patient verbalizes the understanding of the discharge instructions. PIV removed, tip intact, pressure dressing applied. Arm band removed. Patient did not have home meds during admit. Pt remains on DCL O2 concentrator while in DCL. Family has home O2 tank for transportation home. Meds to Beds given. Questions and concerns addressed prior to leaving the discharge lounge. Transported via car, accompanied by wife. Patient discharge to home.

## (undated) DEVICE — GLOVE BIOGEL PI INDICATOR SZ 8.0 SURGICAL PF LF -(50/BX 4BX/CA)

## (undated) DEVICE — CHLORAPREP 26 ML APPLICATOR - ORANGE TINT(25/CA)

## (undated) DEVICE — SUTURE 0 VICRYL PLUS UR-6 - 27 INCH (36/BX)

## (undated) DEVICE — ELECTRODE DUAL RETURN W/ CORD - (50/PK)

## (undated) DEVICE — GOWN SURGEONS X-LARGE - DISP. (30/CA)

## (undated) DEVICE — BAG RETRIEVAL 5MM (10EA/BX)

## (undated) DEVICE — SET EXTENSION WITH 2 PORTS (48EA/CA) ***PART #2C8610 IS A SUBSTITUTE*****

## (undated) DEVICE — FORCEPS FENESTRATED BIPOLAR (14UN/EA)

## (undated) DEVICE — OBTURATOR BLADELESS STANDARD 8MM (6EA/BX)

## (undated) DEVICE — GLOVE BIOGEL INDICATOR SZ 7.5 SURGICAL PF LTX - (50PR/BX 4BX/CA)

## (undated) DEVICE — GLOVE BIOGEL PI INDICATOR SZ 6.5 SURGICAL PF LF - (50/BX 4BX/CA)

## (undated) DEVICE — NEEDLE INSFL 120MM 14GA VRRS - (20/BX)

## (undated) DEVICE — DRAPE COLUMN BOX OF 20

## (undated) DEVICE — CANISTER SUCTION 3000ML MECHANICAL FILTER AUTO SHUTOFF MEDI-VAC NONSTERILE LF DISP (40EA/CA)

## (undated) DEVICE — SUTURE PASSER 14GA DISPOSABLE

## (undated) DEVICE — CLIP HEMOLOCK PURPLE - (14/BX)

## (undated) DEVICE — SEAL UNIVERSAL 5MM-12MM (10EA/BX)

## (undated) DEVICE — Device

## (undated) DEVICE — SUTURE 4-0 MONOCRYL PLUS PS-2 - 27 INCH (36/BX)

## (undated) DEVICE — ANTI-FOG SOLUTION - 60BTL/CA

## (undated) DEVICE — SET LEADWIRE 5 LEAD BEDSIDE DISPOSABLE ECG (1SET OF 5/EA)

## (undated) DEVICE — CLIP APPLIER LARGE DA VINCI 100X'S REUSABLE

## (undated) DEVICE — GLOVE BIOGEL SZ 6.5 SURGICAL PF LTX (50PR/BX 4BX/CA)

## (undated) DEVICE — GLOVE BIOGEL PI INDICATOR SZ 7.5 SURGICAL PF LF -(50/BX 4BX/CA)

## (undated) DEVICE — SHEARS MONOPOLAR CURVED DA VINCI 10X'S REUSABLE

## (undated) DEVICE — SET SUCTION/IRRIGATION WITH DISPOSABLE TIP (6/CA )PART #0250-070-520 IS A SUB

## (undated) DEVICE — GLOVE BIOGEL INDICATOR SZ 7SURGICAL PF LTX - (50/BX 4BX/CA)

## (undated) DEVICE — SYSTEM CLEARIFY VISUALIZATION (10EA/PK)

## (undated) DEVICE — HOOK PERMANENT CAUTERY DA VINCI 10X'S REUSABLE

## (undated) DEVICE — SENSOR OXIMETER ADULT SPO2 RD SET (20EA/BX)

## (undated) DEVICE — DRAPE ARM BOX OF 20

## (undated) DEVICE — TUBING CLEARLINK DUO-VENT - C-FLO (48EA/CA)

## (undated) DEVICE — GLOVE BIOGEL SZ 7 SURGICAL PF LTX - (50PR/BX 4BX/CA)

## (undated) DEVICE — SUTURE GENERAL

## (undated) DEVICE — COVER LIGHT HANDLE ALC PLUS DISP (18EA/BX)

## (undated) DEVICE — IRRIGATOR SUCTION ENDOWRIST DISPOSABLE OD8 MM (6EA/BX)

## (undated) DEVICE — SLEEVE VASO DVT COMPRESSION CALF MED - (10PR/CA)

## (undated) DEVICE — GLOVE SZ 6.5 BIOGEL PI MICRO - PF LF (50PR/BX)

## (undated) DEVICE — LACTATED RINGERS INJ 1000 ML - (14EA/CA 60CA/PF)

## (undated) DEVICE — GLOVE SZ 7.5 BIOGEL PI MICRO - PF LF (50PR/BX)

## (undated) DEVICE — GOWN WARMING STANDARD FLEX - (30/CA)

## (undated) DEVICE — FORCEP CADIERE REUSABLE (18 USES)

## (undated) DEVICE — INTRODUCER BOUGIE ADULT 15FX70CM (10EA/PK)